# Patient Record
Sex: FEMALE | Race: WHITE | NOT HISPANIC OR LATINO | Employment: UNEMPLOYED | URBAN - NONMETROPOLITAN AREA
[De-identification: names, ages, dates, MRNs, and addresses within clinical notes are randomized per-mention and may not be internally consistent; named-entity substitution may affect disease eponyms.]

---

## 2020-05-12 ENCOUNTER — HISTORICAL (OUTPATIENT)
Dept: ADMINISTRATIVE | Facility: HOSPITAL | Age: 35
End: 2020-05-12

## 2020-05-12 LAB
APPEARANCE, UA: CLEAR
B-HCG UR QL: NEGATIVE
BACTERIA SPEC CULT: NEGATIVE /HPF
BILIRUB UR QL STRIP: NEGATIVE MG/DL
BUDDING YEAST: ABNORMAL /HPF
CASTS, URINE MICROSCOPIC: NEGATIVE /LPF
COLOR UR: YELLOW
EPITHELIAL, URINE MICROSCOPIC: NEGATIVE /HPF
GLUCOSE (UA): NEGATIVE MG/DL
HGB UR QL STRIP: NEGATIVE ERY/UL
INTERNAL NEG CONTROL: NEGATIVE
INTERNAL POS CONTROL: POSITIVE
KETONES UR QL STRIP: NEGATIVE MG/DL
LEUKOCYTE ESTERASE UR QL STRIP: NEGATIVE LEU/UL
NITRITE UR QL STRIP: NEGATIVE MG/DL
PH UR STRIP: 5.5 [PH] (ref 5–7.5)
PROT UR QL STRIP: NEGATIVE MG/DL
RBC #/AREA URNS HPF: NEGATIVE /HPF
SP GR UR STRIP: 1 (ref 1–1.03)
SPERM, URINE MICROSCOPIC: ABNORMAL /HPF
TYPE OF SPECIMEN  (UA): ABNORMAL
UNCLASSIFIED CRYSTALS, UA: ABNORMAL /HPF
UROBILINOGEN UR STRIP-ACNC: NORMAL EU/L
WBC #/AREA URNS HPF: NEGATIVE /HPF

## 2020-12-30 ENCOUNTER — HOSPITAL ENCOUNTER (OUTPATIENT)
Dept: PREADMISSION TESTING | Facility: HOSPITAL | Age: 35
Discharge: HOME | End: 2020-12-30
Attending: SURGERY
Payer: MEDICAID

## 2020-12-30 NOTE — DISCHARGE INSTRUCTIONS
[facility]  Pre Admit Instructions    Day and Date of Procedure:      · Call your doctor if you become ill, have an infection, or are taking antibiotics before your surgery  · Someone will call you between 1 p.m. And 5 p.m. the workday before the procedure to give you an arrival time       - Before 7 a.m. Enter through Emergency Room       - 7 a.m. To 5 p.m. Enter through Patient Registration Main Lobby  · You must have a responsible  to bring you home  · Only one person will be allowed per patient due to Covid-19 restrictions  · You must wear a mask at all times. If you do not have a mask, we will provide you with one. No Exception.   · Cafeteria hours for guest: 7am to 10am; 11am to 1:30 pm.    Do NOT eat anything past:   Clear liquids until:  No gum or mints the morning of your procedure    Please    · Do not wear makeup, jewelry, nail polish or body piercings  · Bring containers/solution for contacts, dentures, bridges - these and hearing aids will be removed before your procedure  · Do not bring cash, jewelry or valuables the day of your procedure   · No smoking at least 24 hours before your procedure  · Wear clothing that is comfortable and easy to take off and put on  · Do NOT shave for at least 5 days before your surgery    PRE-OP Hibiclens bath Instructions:    Shower with Hibiclens the Night before and morning of the procedure.   Wash your face with your regular cleanser. DO NOT use Hibiclens on your face.  Thoroughly rinse your body with warm water from the neck down  Apply Hibiclens directly to your skin or on a wet washcloth and wash gently. Do not stand under the water while washing with Hibiclens as you will   remove the wash too soon.  Rinse thoroughly with warm water  DO NOT use your regular soap after you have bathed with the Hibiclens  Do not apply lotion or deodorant   Put on a clean pair of clothes after each bath          Information about your stay (Please Review)    Before  Surgery  1. Your doctor may order and review labs, x-rays, ECG or other tests as a pre-surgery workup and will call you if there is need for follow up.  2. No smoking inside or outside the hospital. You must leave the campus to smoke.  3. Wear clothing that is easy to take off and put on.  The hospital will provide you with a gown.  4. You may bring robe, slippers, nightwear, and toiletries (toothbrush, toothpaste, makeup).  5. If your doctor orders a Fleets Enema or other prep, follow package and/or doctors orders.  6. Brush your teeth and rinse your mouth the morning of surgery, but dont swallow the water.  7. The nurse will ask questions and check your condition.  The doctor may shruthi your surgical site.  8. Compression boots will be placed on your calves to reduce the risk of blood clots.  9. An IV will be started in pre-procedure area.  10. The doctor may order medicine to help you relax before surgery.  After Surgery  1. After you recover in post-procedure area you will go to the medicine floor to recover for a few more hours.  2. The nurse will check your temperature, breathing, blood pressure, heart rate, IV site, and surgery site.  3. A diet will be ordered-most start with ice chips and then advance slowly to other foods.  4. If you have IV fluids the IV pump will beep to let the nurse know that she needs to check it.  5. You may have a urinary catheter and staff may measure your oral intake and urine output.  6. Pain medication may be ordered by the doctor after surgery.  If you have a pain management device tell your caretakers not to press the button because of OVERDOSE RISK.  7. When the nurse or doctor tells you it is okay to get out of bed, ask for help until you are stable.  8. The nurse may ask you to turn, cough, and deep breathe to prevent lung problems.  You can use a pillow to hold your incision when you deep breathe or cough to reduce pain.  9. The nurse will give you discharge  instructions--incision care, symptoms to report to your doctor, and your follow-up appointment when you are discharged.  You cannot drive yourself home.  10. Only one person may be with you during your stay due to Covid-19 restrictions. Visiting hours are 8 am to 6 pm.  Goal for Discharge from One Day Surgery  · Control pain with an oral medication  · Walk without feeling dizzy or weak  · Tolerate liquids well  · Urinate without difficulty    Things you can do to  Reduce the Risk of Infections or Complications  Wash Hands and use Waterless Hand Sanitizers  · Wash hands frequently with soap and warm water for at least 15 seconds.   · Use hand sanitizers (alcohol based) often at home and in public if hands are not visibly soiled  Take Antibiotic Exactly as Prescribed  · Do not stop antibiotics too soon; you risk developing infection resistant to antibiotics  · Take your antibiotic even if you are feeling better and even if they upset your stomach  · Call the doctor if you cant tolerate the antibiotic or you have an allergic reaction  Stay Healthy  · Take medicines as prescribed by your doctor  · Keep your diabetes under control - diet and medication  · Get enough rest, exercise and eat a healthy diet  Keep the Wound Clean and Dry  · Wash hands before and after taking care of the incision (cut)  · Wash hands when you remove a dressing, before you touch/apply a new dressing  · Shower and clean incision with antibacterial soap and rinse well if the doctor approves  · Allow the cut to dry completely before putting on a clean dressing  · Do not touch the part of the bandage that will cover the incision  · Do not use ointments unless your doctor tells you to-can promote bacterial growth  · If ordered, put ointment directly on the dressing-do not touch the end of the tube  · Do not scrub, remove scabs, or leave a damp dressing on the incision  · Do not use peroxide or alcohol to clean the incision unless the doctor tells  you to   · Do not let children, pets or anyone else contaminate the incision  Stop Smoking To Prevent Infection  · Stop smoking-Centers for Disease Control recommends 30 days before surgery  · Smokers get more infections after surgery-studies have shown 6 times the risk  · Smokers have more scarring and heal slower-open wounds get infected easier  Prevent Respiratory complications  · Stop smoking  · Turn, cough, and deep breathe even if you have some pain when you do so.  · Splint your incision with a pillow when you cough/deep breath, to help control pain.  · Do not lie in one position for long periods of time.   Prevent Blood Clots  · When you wake move your legs, flex your feet, rotate your ankles, wiggle your toes  · Get up when the doctor says its ok.  Dangle your feet from the side of the bed  · Report symptoms-leg pain, redness/swelling, warm to touch; fever; shortness of breath, chest pain, severe upper back pain.

## 2021-01-04 ENCOUNTER — ANESTHESIA (OUTPATIENT)
Dept: SURGERY | Facility: HOSPITAL | Age: 36
End: 2021-01-04
Payer: MEDICAID

## 2021-01-04 ENCOUNTER — HOSPITAL ENCOUNTER (OUTPATIENT)
Facility: HOSPITAL | Age: 36
Discharge: HOME | End: 2021-01-04
Attending: SURGERY | Admitting: SURGERY
Payer: MEDICAID

## 2021-01-04 ENCOUNTER — ANESTHESIA EVENT (OUTPATIENT)
Dept: SURGERY | Facility: HOSPITAL | Age: 36
End: 2021-01-04
Payer: MEDICAID

## 2021-01-04 VITALS
OXYGEN SATURATION: 99 % | RESPIRATION RATE: 18 BRPM | SYSTOLIC BLOOD PRESSURE: 101 MMHG | DIASTOLIC BLOOD PRESSURE: 58 MMHG | HEART RATE: 57 BPM | TEMPERATURE: 97 F

## 2021-01-04 DIAGNOSIS — K42.9 UMBILICAL HERNIA: Primary | ICD-10-CM

## 2021-01-04 LAB
B-HCG UR QL: NEGATIVE
SARS-COV-2 RDRP RESP QL NAA+PROBE: NEGATIVE

## 2021-01-04 PROCEDURE — 71000033 HC RECOVERY, INTIAL HOUR: Performed by: SURGERY

## 2021-01-04 PROCEDURE — 36000707: Performed by: SURGERY

## 2021-01-04 PROCEDURE — 94760 N-INVAS EAR/PLS OXIMETRY 1: CPT

## 2021-01-04 PROCEDURE — 81025 URINE PREGNANCY TEST: CPT

## 2021-01-04 PROCEDURE — 37000009 HC ANESTHESIA EA ADD 15 MINS: Performed by: SURGERY

## 2021-01-04 PROCEDURE — 63600175 PHARM REV CODE 636 W HCPCS: Performed by: NURSE ANESTHETIST, CERTIFIED REGISTERED

## 2021-01-04 PROCEDURE — 25000003 PHARM REV CODE 250: Performed by: SURGERY

## 2021-01-04 PROCEDURE — 37000008 HC ANESTHESIA 1ST 15 MINUTES: Performed by: SURGERY

## 2021-01-04 PROCEDURE — 36000706: Performed by: SURGERY

## 2021-01-04 PROCEDURE — 94761 N-INVAS EAR/PLS OXIMETRY MLT: CPT | Mod: 59

## 2021-01-04 PROCEDURE — 00830 ANES HERNIA RPR LWR ABD NOS: CPT | Performed by: SURGERY

## 2021-01-04 PROCEDURE — U0002 COVID-19 LAB TEST NON-CDC: HCPCS

## 2021-01-04 PROCEDURE — 25000003 PHARM REV CODE 250: Performed by: NURSE ANESTHETIST, CERTIFIED REGISTERED

## 2021-01-04 PROCEDURE — 63600175 PHARM REV CODE 636 W HCPCS: Performed by: SURGERY

## 2021-01-04 PROCEDURE — 00750 ANES HRNA RPR UPR ABD NOS: CPT | Mod: QZ | Performed by: NURSE ANESTHETIST, CERTIFIED REGISTERED

## 2021-01-04 RX ORDER — FENTANYL CITRATE 50 UG/ML
INJECTION, SOLUTION INTRAMUSCULAR; INTRAVENOUS
Status: DISCONTINUED | OUTPATIENT
Start: 2021-01-04 | End: 2021-01-04

## 2021-01-04 RX ORDER — CEFAZOLIN SODIUM 2 G/50ML
2 SOLUTION INTRAVENOUS
Status: COMPLETED | OUTPATIENT
Start: 2021-01-04 | End: 2021-01-04

## 2021-01-04 RX ORDER — KETOROLAC TROMETHAMINE 30 MG/ML
INJECTION, SOLUTION INTRAMUSCULAR; INTRAVENOUS
Status: DISCONTINUED | OUTPATIENT
Start: 2021-01-04 | End: 2021-01-04

## 2021-01-04 RX ORDER — NEOSTIGMINE METHYLSULFATE 5 MG/5 ML
SYRINGE (ML) INTRAVENOUS
Status: DISCONTINUED | OUTPATIENT
Start: 2021-01-04 | End: 2021-01-04

## 2021-01-04 RX ORDER — MIDAZOLAM HYDROCHLORIDE 1 MG/ML
INJECTION INTRAMUSCULAR; INTRAVENOUS
Status: DISCONTINUED | OUTPATIENT
Start: 2021-01-04 | End: 2021-01-04

## 2021-01-04 RX ORDER — HYDROMORPHONE HYDROCHLORIDE 1 MG/ML
1 INJECTION, SOLUTION INTRAMUSCULAR; INTRAVENOUS; SUBCUTANEOUS EVERY 4 HOURS PRN
Status: DISCONTINUED | OUTPATIENT
Start: 2021-01-04 | End: 2021-01-04 | Stop reason: HOSPADM

## 2021-01-04 RX ORDER — IBUPROFEN 600 MG/1
600 TABLET ORAL EVERY 6 HOURS PRN
Status: DISCONTINUED | OUTPATIENT
Start: 2021-01-04 | End: 2021-01-04 | Stop reason: HOSPADM

## 2021-01-04 RX ORDER — DEXAMETHASONE SODIUM PHOSPHATE 4 MG/ML
INJECTION, SOLUTION INTRA-ARTICULAR; INTRALESIONAL; INTRAMUSCULAR; INTRAVENOUS; SOFT TISSUE
Status: DISCONTINUED | OUTPATIENT
Start: 2021-01-04 | End: 2021-01-04

## 2021-01-04 RX ORDER — SODIUM CHLORIDE, SODIUM LACTATE, POTASSIUM CHLORIDE, CALCIUM CHLORIDE 600; 310; 30; 20 MG/100ML; MG/100ML; MG/100ML; MG/100ML
INJECTION, SOLUTION INTRAVENOUS CONTINUOUS PRN
Status: DISCONTINUED | OUTPATIENT
Start: 2021-01-04 | End: 2021-01-04

## 2021-01-04 RX ORDER — ROCURONIUM BROMIDE 10 MG/ML
INJECTION, SOLUTION INTRAVENOUS
Status: DISCONTINUED | OUTPATIENT
Start: 2021-01-04 | End: 2021-01-04

## 2021-01-04 RX ORDER — LIDOCAINE HYDROCHLORIDE 20 MG/ML
INJECTION, SOLUTION EPIDURAL; INFILTRATION; INTRACAUDAL; PERINEURAL
Status: DISCONTINUED | OUTPATIENT
Start: 2021-01-04 | End: 2021-01-04

## 2021-01-04 RX ORDER — BUPIVACAINE HYDROCHLORIDE AND EPINEPHRINE 5; 5 MG/ML; UG/ML
INJECTION, SOLUTION EPIDURAL; INTRACAUDAL; PERINEURAL
Status: DISCONTINUED
Start: 2021-01-04 | End: 2021-01-04 | Stop reason: HOSPADM

## 2021-01-04 RX ORDER — HYDROCODONE BITARTRATE AND ACETAMINOPHEN 5; 325 MG/1; MG/1
1 TABLET ORAL EVERY 4 HOURS PRN
Status: DISCONTINUED | OUTPATIENT
Start: 2021-01-04 | End: 2021-01-04 | Stop reason: HOSPADM

## 2021-01-04 RX ORDER — ONDANSETRON 2 MG/ML
4 INJECTION INTRAMUSCULAR; INTRAVENOUS EVERY 12 HOURS PRN
Status: DISCONTINUED | OUTPATIENT
Start: 2021-01-04 | End: 2021-01-04 | Stop reason: HOSPADM

## 2021-01-04 RX ORDER — ACETAMINOPHEN 10 MG/ML
INJECTION, SOLUTION INTRAVENOUS
Status: DISCONTINUED | OUTPATIENT
Start: 2021-01-04 | End: 2021-01-04

## 2021-01-04 RX ORDER — SODIUM CHLORIDE 9 MG/ML
INJECTION, SOLUTION INTRAVENOUS CONTINUOUS
Status: DISCONTINUED | OUTPATIENT
Start: 2021-01-04 | End: 2021-01-04 | Stop reason: HOSPADM

## 2021-01-04 RX ORDER — ONDANSETRON HYDROCHLORIDE 2 MG/ML
INJECTION, SOLUTION INTRAMUSCULAR; INTRAVENOUS
Status: DISCONTINUED | OUTPATIENT
Start: 2021-01-04 | End: 2021-01-04

## 2021-01-04 RX ORDER — BUPIVACAINE HYDROCHLORIDE AND EPINEPHRINE 5; 5 MG/ML; UG/ML
INJECTION, SOLUTION EPIDURAL; INTRACAUDAL; PERINEURAL
Status: DISCONTINUED | OUTPATIENT
Start: 2021-01-04 | End: 2021-01-04 | Stop reason: HOSPADM

## 2021-01-04 RX ORDER — PROPOFOL 10 MG/ML
VIAL (ML) INTRAVENOUS
Status: DISCONTINUED | OUTPATIENT
Start: 2021-01-04 | End: 2021-01-04

## 2021-01-04 RX ADMIN — FENTANYL CITRATE 50 MCG: 50 INJECTION, SOLUTION INTRAMUSCULAR; INTRAVENOUS at 09:01

## 2021-01-04 RX ADMIN — FENTANYL CITRATE 100 MCG: 50 INJECTION, SOLUTION INTRAMUSCULAR; INTRAVENOUS at 09:01

## 2021-01-04 RX ADMIN — ROCURONIUM BROMIDE 30 MG: 10 INJECTION, SOLUTION INTRAVENOUS at 09:01

## 2021-01-04 RX ADMIN — MIDAZOLAM HYDROCHLORIDE 4 MG: 1 INJECTION, SOLUTION INTRAMUSCULAR; INTRAVENOUS at 09:01

## 2021-01-04 RX ADMIN — Medication 4 MG: at 09:01

## 2021-01-04 RX ADMIN — GLYCOPYRROLATE 0.4 MG: 0.2 INJECTION, SOLUTION INTRAMUSCULAR; INTRAVENOUS at 09:01

## 2021-01-04 RX ADMIN — DEXAMETHASONE SODIUM PHOSPHATE 8 MG: 4 INJECTION, SOLUTION INTRAMUSCULAR; INTRAVENOUS at 09:01

## 2021-01-04 RX ADMIN — SODIUM CHLORIDE, SODIUM LACTATE, POTASSIUM CHLORIDE, AND CALCIUM CHLORIDE: .6; .31; .03; .02 INJECTION, SOLUTION INTRAVENOUS at 09:01

## 2021-01-04 RX ADMIN — PROPOFOL 150 MG: 10 INJECTION, EMULSION INTRAVENOUS at 09:01

## 2021-01-04 RX ADMIN — ACETAMINOPHEN 1000 MG: 10 INJECTION, SOLUTION INTRAVENOUS at 09:01

## 2021-01-04 RX ADMIN — CEFAZOLIN SODIUM 2 G: 2 SOLUTION INTRAVENOUS at 09:01

## 2021-01-04 RX ADMIN — KETOROLAC TROMETHAMINE 30 MG: 30 INJECTION, SOLUTION INTRAMUSCULAR; INTRAVENOUS at 09:01

## 2021-01-04 RX ADMIN — ONDANSETRON 8 MG: 2 INJECTION, SOLUTION INTRAMUSCULAR; INTRAVENOUS at 09:01

## 2021-01-04 RX ADMIN — LIDOCAINE HYDROCHLORIDE 80 MG: 20 INJECTION, SOLUTION EPIDURAL; INFILTRATION; INTRACAUDAL; PERINEURAL at 09:01

## 2021-06-07 ENCOUNTER — TELEPHONE (OUTPATIENT)
Dept: OBSTETRICS AND GYNECOLOGY | Facility: CLINIC | Age: 36
End: 2021-06-07

## 2021-06-13 ENCOUNTER — HOSPITAL ENCOUNTER (EMERGENCY)
Facility: HOSPITAL | Age: 36
Discharge: HOME | End: 2021-06-13
Attending: EMERGENCY MEDICINE
Payer: MEDICAID

## 2021-06-13 VITALS
HEART RATE: 100 BPM | RESPIRATION RATE: 18 BRPM | WEIGHT: 113 LBS | OXYGEN SATURATION: 99 % | SYSTOLIC BLOOD PRESSURE: 134 MMHG | DIASTOLIC BLOOD PRESSURE: 97 MMHG | HEIGHT: 65 IN | BODY MASS INDEX: 18.83 KG/M2 | TEMPERATURE: 98 F

## 2021-06-13 DIAGNOSIS — R11.2 NON-INTRACTABLE VOMITING WITH NAUSEA, UNSPECIFIED VOMITING TYPE: Primary | ICD-10-CM

## 2021-06-13 PROCEDURE — 63600175 PHARM REV CODE 636 W HCPCS: Performed by: NURSE PRACTITIONER

## 2021-06-13 PROCEDURE — 99284 EMERGENCY DEPT VISIT MOD MDM: CPT | Mod: 25

## 2021-06-13 PROCEDURE — 96372 THER/PROPH/DIAG INJ SC/IM: CPT

## 2021-06-13 RX ORDER — PROMETHAZINE HYDROCHLORIDE 25 MG/ML
25 INJECTION, SOLUTION INTRAMUSCULAR; INTRAVENOUS
Status: COMPLETED | OUTPATIENT
Start: 2021-06-13 | End: 2021-06-13

## 2021-06-13 RX ORDER — PROMETHAZINE HYDROCHLORIDE 25 MG/1
25 TABLET ORAL EVERY 6 HOURS PRN
Qty: 15 TABLET | Refills: 0 | Status: SHIPPED | OUTPATIENT
Start: 2021-06-13 | End: 2023-04-03 | Stop reason: CLARIF

## 2021-06-13 RX ADMIN — PROMETHAZINE HYDROCHLORIDE 25 MG: 25 INJECTION INTRAMUSCULAR; INTRAVENOUS at 03:06

## 2021-11-01 ENCOUNTER — HOSPITAL ENCOUNTER (EMERGENCY)
Facility: HOSPITAL | Age: 36
Discharge: HOME | End: 2021-11-01
Attending: EMERGENCY MEDICINE
Payer: MEDICAID

## 2021-11-01 VITALS
WEIGHT: 114 LBS | HEART RATE: 58 BPM | BODY MASS INDEX: 18.99 KG/M2 | HEIGHT: 65 IN | RESPIRATION RATE: 16 BRPM | SYSTOLIC BLOOD PRESSURE: 142 MMHG | TEMPERATURE: 97 F | OXYGEN SATURATION: 100 % | DIASTOLIC BLOOD PRESSURE: 79 MMHG

## 2021-11-01 DIAGNOSIS — O21.9 VOMITING AFFECTING PREGNANCY: Primary | ICD-10-CM

## 2021-11-01 LAB
ALBUMIN SERPL BCP-MCNC: 4.4 G/DL (ref 3.5–5.2)
ALP SERPL-CCNC: 50 U/L (ref 55–135)
ALT SERPL W/O P-5'-P-CCNC: 32 U/L (ref 10–44)
AMPHET+METHAMPHET UR QL: NEGATIVE
ANION GAP SERPL CALC-SCNC: 7 MMOL/L (ref 8–16)
AST SERPL-CCNC: 22 U/L (ref 10–40)
B-HCG UR QL: POSITIVE
BARBITURATES UR QL SCN>200 NG/ML: NEGATIVE
BASOPHILS # BLD AUTO: 0.04 K/UL (ref 0–0.2)
BASOPHILS NFR BLD: 0.5 % (ref 0–1.9)
BENZODIAZ UR QL SCN>200 NG/ML: NEGATIVE
BILIRUB SERPL-MCNC: 0.8 MG/DL (ref 0.1–1)
BUN SERPL-MCNC: 12 MG/DL (ref 6–20)
BZE UR QL SCN: NEGATIVE
CALCIUM SERPL-MCNC: 9.2 MG/DL (ref 8.7–10.5)
CANNABINOIDS UR QL SCN: ABNORMAL
CHLORIDE SERPL-SCNC: 101 MMOL/L (ref 95–110)
CO2 SERPL-SCNC: 27 MMOL/L (ref 23–29)
CREAT SERPL-MCNC: 0.7 MG/DL (ref 0.5–1.4)
CREAT UR-MCNC: 158 MG/DL (ref 15–325)
DIFFERENTIAL METHOD: ABNORMAL
EOSINOPHIL # BLD AUTO: 0 K/UL (ref 0–0.5)
EOSINOPHIL NFR BLD: 0.2 % (ref 0–8)
ERYTHROCYTE [DISTWIDTH] IN BLOOD BY AUTOMATED COUNT: 11.6 % (ref 11.5–14.5)
EST. GFR  (AFRICAN AMERICAN): >60 ML/MIN/1.73 M^2
EST. GFR  (NON AFRICAN AMERICAN): >60 ML/MIN/1.73 M^2
GLUCOSE SERPL-MCNC: 99 MG/DL (ref 70–110)
HCT VFR BLD AUTO: 47.3 % (ref 37–48.5)
HGB BLD-MCNC: 16.4 G/DL (ref 12–16)
IMM GRANULOCYTES # BLD AUTO: 0.05 K/UL (ref 0–0.04)
IMM GRANULOCYTES NFR BLD AUTO: 0.6 % (ref 0–0.5)
LYMPHOCYTES # BLD AUTO: 1.3 K/UL (ref 1–4.8)
LYMPHOCYTES NFR BLD: 15.8 % (ref 18–48)
MCH RBC QN AUTO: 31.9 PG (ref 27–31)
MCHC RBC AUTO-ENTMCNC: 34.7 G/DL (ref 32–36)
MCV RBC AUTO: 92 FL (ref 82–98)
METHADONE UR QL SCN>300 NG/ML: NEGATIVE
MONOCYTES # BLD AUTO: 0.5 K/UL (ref 0.3–1)
MONOCYTES NFR BLD: 5.6 % (ref 4–15)
NEUTROPHILS # BLD AUTO: 6.4 K/UL (ref 1.8–7.7)
NEUTROPHILS NFR BLD: 77.3 % (ref 38–73)
NRBC BLD-RTO: 0 /100 WBC
OPIATES UR QL SCN: NEGATIVE
PCP UR QL SCN>25 NG/ML: NEGATIVE
PLATELET # BLD AUTO: 269 K/UL (ref 150–450)
PMV BLD AUTO: 8.9 FL (ref 9.2–12.9)
POTASSIUM SERPL-SCNC: 3.7 MMOL/L (ref 3.5–5.1)
PROT SERPL-MCNC: 8.2 G/DL (ref 6–8.4)
RBC # BLD AUTO: 5.14 M/UL (ref 4–5.4)
SODIUM SERPL-SCNC: 135 MMOL/L (ref 136–145)
TOXICOLOGY INFORMATION: ABNORMAL
WBC # BLD AUTO: 8.22 K/UL (ref 3.9–12.7)

## 2021-11-01 PROCEDURE — 63600175 PHARM REV CODE 636 W HCPCS: Performed by: CLINICAL NURSE SPECIALIST

## 2021-11-01 PROCEDURE — 80053 COMPREHEN METABOLIC PANEL: CPT | Performed by: CLINICAL NURSE SPECIALIST

## 2021-11-01 PROCEDURE — 81025 URINE PREGNANCY TEST: CPT | Performed by: CLINICAL NURSE SPECIALIST

## 2021-11-01 PROCEDURE — 96374 THER/PROPH/DIAG INJ IV PUSH: CPT

## 2021-11-01 PROCEDURE — 25000003 PHARM REV CODE 250: Performed by: CLINICAL NURSE SPECIALIST

## 2021-11-01 PROCEDURE — 96361 HYDRATE IV INFUSION ADD-ON: CPT

## 2021-11-01 PROCEDURE — 99284 EMERGENCY DEPT VISIT MOD MDM: CPT | Mod: 25

## 2021-11-01 PROCEDURE — 85025 COMPLETE CBC W/AUTO DIFF WBC: CPT | Performed by: CLINICAL NURSE SPECIALIST

## 2021-11-01 PROCEDURE — 80307 DRUG TEST PRSMV CHEM ANLYZR: CPT | Performed by: CLINICAL NURSE SPECIALIST

## 2021-11-01 RX ORDER — ONDANSETRON 4 MG/1
4 TABLET, FILM COATED ORAL EVERY 6 HOURS
Qty: 12 TABLET | Refills: 0 | Status: SHIPPED | OUTPATIENT
Start: 2021-11-01 | End: 2023-04-03 | Stop reason: CLARIF

## 2021-11-01 RX ORDER — PROCHLORPERAZINE EDISYLATE 5 MG/ML
10 INJECTION INTRAMUSCULAR; INTRAVENOUS
Status: COMPLETED | OUTPATIENT
Start: 2021-11-01 | End: 2021-11-01

## 2021-11-01 RX ADMIN — SODIUM CHLORIDE 1000 ML: 0.9 INJECTION, SOLUTION INTRAVENOUS at 10:11

## 2021-11-01 RX ADMIN — PROCHLORPERAZINE EDISYLATE 10 MG: 5 INJECTION INTRAMUSCULAR; INTRAVENOUS at 09:11

## 2022-05-31 ENCOUNTER — HOSPITAL ENCOUNTER (EMERGENCY)
Facility: HOSPITAL | Age: 37
Discharge: HOME | End: 2022-05-31
Attending: FAMILY MEDICINE
Payer: MEDICAID

## 2022-05-31 VITALS
TEMPERATURE: 98 F | RESPIRATION RATE: 18 BRPM | WEIGHT: 120 LBS | OXYGEN SATURATION: 99 % | SYSTOLIC BLOOD PRESSURE: 117 MMHG | BODY MASS INDEX: 19.99 KG/M2 | DIASTOLIC BLOOD PRESSURE: 64 MMHG | HEIGHT: 65 IN | HEART RATE: 105 BPM

## 2022-05-31 DIAGNOSIS — M25.561 ACUTE PAIN OF RIGHT KNEE: Primary | ICD-10-CM

## 2022-05-31 PROCEDURE — 25000003 PHARM REV CODE 250: Performed by: CLINICAL NURSE SPECIALIST

## 2022-05-31 PROCEDURE — 99284 EMERGENCY DEPT VISIT MOD MDM: CPT | Mod: 25

## 2022-05-31 RX ORDER — HYDROCODONE BITARTRATE AND ACETAMINOPHEN 10; 325 MG/1; MG/1
1 TABLET ORAL
Status: COMPLETED | OUTPATIENT
Start: 2022-05-31 | End: 2022-05-31

## 2022-05-31 RX ORDER — METHOCARBAMOL 500 MG/1
500 TABLET, FILM COATED ORAL 4 TIMES DAILY
Qty: 40 TABLET | Refills: 0 | Status: SHIPPED | OUTPATIENT
Start: 2022-05-31 | End: 2022-06-10

## 2022-05-31 RX ORDER — KETOROLAC TROMETHAMINE 10 MG/1
10 TABLET, FILM COATED ORAL EVERY 6 HOURS
Qty: 20 TABLET | Refills: 0 | Status: SHIPPED | OUTPATIENT
Start: 2022-05-31 | End: 2022-06-05

## 2022-05-31 RX ADMIN — HYDROCODONE BITARTRATE AND ACETAMINOPHEN 1 TABLET: 10; 325 TABLET ORAL at 11:05

## 2022-05-31 NOTE — ED PROVIDER NOTES
Encounter Date: 5/31/2022       History     Chief Complaint   Patient presents with    Knee Injury     Pt stated that she fell last night injuring right knee - stated that when she would attempt to bear weight it would give out. Presents to ED today with pain.      Flora Madrid is an 36 y.o. female who complains of right knee pain since last night.  Patient states she tripped and fell and now unable to bear weight.  Patient states she attempted to bear weight last night in the hip giving out.        Review of patient's allergies indicates:   Allergen Reactions    Latex, natural rubber      No past medical history on file.  Past Surgical History:   Procedure Laterality Date    CERVICAL BIOPSY  W/ LOOP ELECTRODE EXCISION      UMBILICAL HERNIA REPAIR N/A 1/4/2021    Procedure: REPAIR, HERNIA, UMBILICAL;  Surgeon: Castro De Leon MD;  Location: [facility] OR;  Service: General;  Laterality: N/A;     Family History   Problem Relation Age of Onset    Ovarian cancer Mother     COPD Mother     Heart disease Mother     Prostate cancer Father     Heart attack Father     Heart disease Father     Stroke Father      Social History     Tobacco Use    Smoking status: Never Smoker   Substance Use Topics    Alcohol use: Yes     Comment: socially     Drug use: Yes     Types: Marijuana     Review of Systems   Constitutional: Negative for fever.   HENT: Negative for sore throat.    Respiratory: Negative for shortness of breath.    Cardiovascular: Negative for chest pain.   Gastrointestinal: Negative for nausea.   Genitourinary: Negative for dysuria.   Musculoskeletal: Positive for gait problem.   Skin: Negative for rash.   Neurological: Negative for weakness.   Hematological: Does not bruise/bleed easily.   All other systems reviewed and are negative.      Physical Exam     Initial Vitals [05/31/22 1015]   BP Pulse Resp Temp SpO2   117/64 105 16 98.4 °F (36.9 °C) 99 %      MAP       --         Physical Exam    Nursing note  and vitals reviewed.  Constitutional: She appears well-developed and well-nourished.   HENT:   Head: Normocephalic and atraumatic.   Eyes: Pupils are equal, round, and reactive to light.   Neck:   Normal range of motion.  Cardiovascular: Normal rate and regular rhythm.   Pulmonary/Chest: Breath sounds normal.   Abdominal: Abdomen is soft. Bowel sounds are normal.   Musculoskeletal:         General: Tenderness and edema present.      Cervical back: Normal range of motion.     Neurological: She is alert and oriented to person, place, and time.   Skin: Skin is warm and dry.   Psychiatric: She has a normal mood and affect.         ED Course   Procedures  Labs Reviewed - No data to display       Imaging Results          X-Ray Knee 1 or 2 View Right (Final result)  Result time 05/31/22 10:51:24    Final result by Ketan Fernandes MD (05/31/22 10:51:24)                 Impression:      No fracture.      Electronically signed by: Ketan Fernandes MD  Date:    05/31/2022  Time:    10:51             Narrative:    EXAMINATION:  XR KNEE 1 OR 2 VIEW RIGHT    CLINICAL HISTORY:  fall;    COMPARISON:  None    FINDINGS:  Right knee radiographs, three views, demonstrate no fracture or dislocation.  No focal soft tissue abnormality.                    ED Interpretation by Dennis Kc Jr., MD (05/31/22 10:43:18, [facility], Emergency Medicine)    No acute abnormality noted                               Medications - No data to display  Medical Decision Making:   Differential Diagnosis:   Right knee contusion, right knee fracture  Clinical Tests:   Radiological Study: Ordered and Reviewed                      Clinical Impression:   Final diagnoses:  [M25.561] Acute pain of right knee (Primary)          ED Disposition Condition    Discharge Stable        ED Prescriptions     Medication Sig Dispense Start Date End Date Auth. Provider    methocarbamoL (ROBAXIN) 500 MG Tab Take 1 tablet (500 mg total) by mouth 4  (four) times daily. for 10 days 40 tablet 5/31/2022 6/10/2022 Gwen Marques NP    ketorolac (TORADOL) 10 mg tablet Take 1 tablet (10 mg total) by mouth every 6 (six) hours. for 5 days 20 tablet 5/31/2022 6/5/2022 Gwen Marques NP        Follow-up Information     Follow up With Specialties Details Why Contact Info    Rose Bergman MD Internal Medicine  As needed [address] [address]  [phone]             Gwen Marques NP  05/31/22 4766

## 2022-06-06 ENCOUNTER — OFFICE VISIT (OUTPATIENT)
Dept: ORTHOPEDICS | Facility: CLINIC | Age: 37
End: 2022-06-06
Payer: MEDICAID

## 2022-06-06 VITALS
HEART RATE: 81 BPM | TEMPERATURE: 98 F | OXYGEN SATURATION: 100 % | DIASTOLIC BLOOD PRESSURE: 84 MMHG | SYSTOLIC BLOOD PRESSURE: 139 MMHG | BODY MASS INDEX: 20.49 KG/M2 | WEIGHT: 120 LBS | HEIGHT: 64 IN

## 2022-06-06 DIAGNOSIS — M25.561 ACUTE PAIN OF RIGHT KNEE: Primary | ICD-10-CM

## 2022-06-06 PROCEDURE — 3075F SYST BP GE 130 - 139MM HG: CPT | Mod: CPTII,,, | Performed by: NURSE PRACTITIONER

## 2022-06-06 PROCEDURE — 1159F MED LIST DOCD IN RCRD: CPT | Mod: CPTII,,, | Performed by: NURSE PRACTITIONER

## 2022-06-06 PROCEDURE — 1160F RVW MEDS BY RX/DR IN RCRD: CPT | Mod: CPTII,,, | Performed by: NURSE PRACTITIONER

## 2022-06-06 PROCEDURE — 1160F PR REVIEW ALL MEDS BY PRESCRIBER/CLIN PHARMACIST DOCUMENTED: ICD-10-PCS | Mod: CPTII,,, | Performed by: NURSE PRACTITIONER

## 2022-06-06 PROCEDURE — 3075F PR MOST RECENT SYSTOLIC BLOOD PRESS GE 130-139MM HG: ICD-10-PCS | Mod: CPTII,,, | Performed by: NURSE PRACTITIONER

## 2022-06-06 PROCEDURE — 3079F PR MOST RECENT DIASTOLIC BLOOD PRESSURE 80-89 MM HG: ICD-10-PCS | Mod: CPTII,,, | Performed by: NURSE PRACTITIONER

## 2022-06-06 PROCEDURE — 99213 OFFICE O/P EST LOW 20 MIN: CPT | Mod: PBBFAC | Performed by: NURSE PRACTITIONER

## 2022-06-06 PROCEDURE — 3008F PR BODY MASS INDEX (BMI) DOCUMENTED: ICD-10-PCS | Mod: CPTII,,, | Performed by: NURSE PRACTITIONER

## 2022-06-06 PROCEDURE — 99999 PR PBB SHADOW E&M-EST. PATIENT-LVL III: ICD-10-PCS | Mod: PBBFAC,,, | Performed by: NURSE PRACTITIONER

## 2022-06-06 PROCEDURE — 99999 PR PBB SHADOW E&M-EST. PATIENT-LVL III: CPT | Mod: PBBFAC,,, | Performed by: NURSE PRACTITIONER

## 2022-06-06 PROCEDURE — 99203 PR OFFICE/OUTPT VISIT, NEW, LEVL III, 30-44 MIN: ICD-10-PCS | Mod: S$PBB,,, | Performed by: NURSE PRACTITIONER

## 2022-06-06 PROCEDURE — 3079F DIAST BP 80-89 MM HG: CPT | Mod: CPTII,,, | Performed by: NURSE PRACTITIONER

## 2022-06-06 PROCEDURE — 99203 OFFICE O/P NEW LOW 30 MIN: CPT | Mod: S$PBB,,, | Performed by: NURSE PRACTITIONER

## 2022-06-06 PROCEDURE — 3008F BODY MASS INDEX DOCD: CPT | Mod: CPTII,,, | Performed by: NURSE PRACTITIONER

## 2022-06-06 PROCEDURE — 1159F PR MEDICATION LIST DOCUMENTED IN MEDICAL RECORD: ICD-10-PCS | Mod: CPTII,,, | Performed by: NURSE PRACTITIONER

## 2022-06-06 RX ORDER — LISDEXAMFETAMINE DIMESYLATE CAPSULES 20 MG/1
30 CAPSULE ORAL EVERY MORNING
COMMUNITY

## 2022-06-06 NOTE — PROGRESS NOTES
Subjective:      Flora Madrid is a 36 y.o. female referred by [facility] for evaluation and treatment of right knee pain. This is evaluated as a personal injury. Date of injury was on 05/30/22. She states that she had a fall and twisted the knee. She had immediate pain and difficulty bearing weight. She reports swelling within a few hours. She went to the ER the following day due to pain. Radiographs were done and fractures were seen. She was placed on Robaxin and Toradol. She was instructed that she sprained her knee/possible internal derangement and to follow up with orthopedics. She presents and rates her pain as 7/10. The pain's location is medial. She describes the symptoms as sharp and throbbing. Symptoms improve minimal with rest, ice and Nsaid. Symptoms worsen with activity, weight bearing and ROM. The knee has given out or felt unstable since injury. The patient cannot bend and straighten the knee fully. Treatment to date has been ice, NSAID's, without significant relief.    Outside reports reviewed: office notes.       Review of Systems   Constitutional: Negative.  Negative for fever.   Musculoskeletal: Positive for joint pain.   Skin: Negative.    Neurological: Negative.    Psychiatric/Behavioral: Negative.    All other systems reviewed and are negative.     Objective:     NVI  General :   alert, appears stated age and cooperative   Gait: Antalgic. The patient can bear weight on the injured extremity.   Right Lower Extremity  Hip Palpation:  no tenderness over the greater  trochanter   Hip ROM: 100% of normal      Knee Effusion:  1+   Ecchymosis:  none   Knee ROM:  5 to 120 degrees without subpatellar crepitance.   Patella:  Patella does track normally.  Patellar apprehension test: negative  Patellar compression test: negative   Tenderness: medial joint line   Stability:  Lachman's test: negative  Posterior drawer: negative  Medial collateral ligament: present  Lateral collateral ligament: negative     Russell  Test:  negative   Kadeem's Test:  present with no joint line tenderness   Sensation:   intact to light touch   Pulses: normal DP and PT pulses.    Contralateral knee was without deficit.   Brisk cap refill.     Imaging  Radiographs reviewed from 05/31/22.  Right knee radiographs, three views, demonstrate no fracture or dislocation.  No focal soft tissue abnormality.  Assessment:     1. RT knee pain, effusion and unknown internal derangement.     Plan:     1. Treatment options discussed including: Application of crossfire brace for joint support, instructed on usage.   2. Home directed exercises as reviewed for ROM and strengthening, packet given.   3. Pennsaid BID as directed (sample) and ice.  4. Follow up in one week. Consider MRI if pain, swelling and instability continues with concerns of internal derangement.   5. She had no further questions.

## 2022-06-14 ENCOUNTER — OFFICE VISIT (OUTPATIENT)
Dept: ORTHOPEDICS | Facility: CLINIC | Age: 37
End: 2022-06-14
Payer: MEDICAID

## 2022-06-14 VITALS — SYSTOLIC BLOOD PRESSURE: 125 MMHG | DIASTOLIC BLOOD PRESSURE: 76 MMHG

## 2022-06-14 DIAGNOSIS — M25.561 ACUTE PAIN OF RIGHT KNEE: Primary | ICD-10-CM

## 2022-06-14 DIAGNOSIS — M25.461 EFFUSION OF RIGHT KNEE: ICD-10-CM

## 2022-06-14 DIAGNOSIS — M23.91 INTERNAL DERANGEMENT OF RIGHT KNEE: ICD-10-CM

## 2022-06-14 PROCEDURE — 1159F PR MEDICATION LIST DOCUMENTED IN MEDICAL RECORD: ICD-10-PCS | Mod: CPTII,,, | Performed by: NURSE PRACTITIONER

## 2022-06-14 PROCEDURE — 1159F MED LIST DOCD IN RCRD: CPT | Mod: CPTII,,, | Performed by: NURSE PRACTITIONER

## 2022-06-14 PROCEDURE — 99213 OFFICE O/P EST LOW 20 MIN: CPT | Mod: S$PBB,,, | Performed by: NURSE PRACTITIONER

## 2022-06-14 PROCEDURE — 3074F PR MOST RECENT SYSTOLIC BLOOD PRESSURE < 130 MM HG: ICD-10-PCS | Mod: CPTII,,, | Performed by: NURSE PRACTITIONER

## 2022-06-14 PROCEDURE — 3078F DIAST BP <80 MM HG: CPT | Mod: CPTII,,, | Performed by: NURSE PRACTITIONER

## 2022-06-14 PROCEDURE — 99999 PR PBB SHADOW E&M-EST. PATIENT-LVL II: ICD-10-PCS | Mod: PBBFAC,,, | Performed by: NURSE PRACTITIONER

## 2022-06-14 PROCEDURE — 3078F PR MOST RECENT DIASTOLIC BLOOD PRESSURE < 80 MM HG: ICD-10-PCS | Mod: CPTII,,, | Performed by: NURSE PRACTITIONER

## 2022-06-14 PROCEDURE — 99213 PR OFFICE/OUTPT VISIT, EST, LEVL III, 20-29 MIN: ICD-10-PCS | Mod: S$PBB,,, | Performed by: NURSE PRACTITIONER

## 2022-06-14 PROCEDURE — 1160F RVW MEDS BY RX/DR IN RCRD: CPT | Mod: CPTII,,, | Performed by: NURSE PRACTITIONER

## 2022-06-14 PROCEDURE — 3074F SYST BP LT 130 MM HG: CPT | Mod: CPTII,,, | Performed by: NURSE PRACTITIONER

## 2022-06-14 PROCEDURE — 99212 OFFICE O/P EST SF 10 MIN: CPT | Mod: PBBFAC | Performed by: NURSE PRACTITIONER

## 2022-06-14 PROCEDURE — 1160F PR REVIEW ALL MEDS BY PRESCRIBER/CLIN PHARMACIST DOCUMENTED: ICD-10-PCS | Mod: CPTII,,, | Performed by: NURSE PRACTITIONER

## 2022-06-14 PROCEDURE — 99999 PR PBB SHADOW E&M-EST. PATIENT-LVL II: CPT | Mod: PBBFAC,,, | Performed by: NURSE PRACTITIONER

## 2022-06-14 NOTE — PROGRESS NOTES
Subjective:      Follow up: RT knee pain     Flora Madrid is a 36 y.o. female returns for follow up for right knee pain. Date of injury was on 05/30/22 due to a fall.  She presents and rates her pain as 7-8/10. The pain's location is medial as previous. She continues with swelling, giving way and occasional locking. She does not have full ROM. She describes the symptoms as sharp and throbbing. Symptoms improve minimal with rest, ice and Nsaid (pennsaid). Symptoms worsen with activity, weight bearing and ROM. She is noted with a limp.          Review of Systems   Constitutional: Negative.  Negative for fever.   Musculoskeletal: Positive for joint pain.   Skin: Negative.    Neurological: Negative.    Psychiatric/Behavioral: Negative.    All other systems reviewed and are negative.     Objective:      NVI  General :   alert, appears stated age and cooperative   Gait: Antalgic. The patient can bear weight on the injured extremity but has pain to do so.   Right Lower Extremity  Hip Palpation:  no tenderness over the greater  trochanter   Hip ROM: 100% of normal       Knee Effusion:  1+   Ecchymosis:  none   Knee ROM:  5 to 115 degrees without subpatellar crepitance.   Patella:  Patella does track normally.  Patellar apprehension test: negative  Patellar compression test: negative   Tenderness: medial joint line   Stability:  Lachman's test: guarded  Posterior drawer: negative  Medial collateral ligament: present  Lateral collateral ligament: negative     Russell Test:  negative   Kadeem's Test:  present with medial joint line tenderness   Sensation:   intact to light touch   Pulses: normal DP and PT pulses.    Contralateral knee was without deficit.   Brisk cap refill.      Imaging  None today.   Assessment:      1. RT knee pain, effusion and unknown internal derangement.     Plan:      1. MRI RT knee to evaluate for internal derangement. She has pain, effusion, decreased ROM and pos [place]. She has had no relief  with bracing, ice, rest and Nsaid.  2. Continue home directed exercises as reviewed for ROM and strengthening as previous.   3. Continue Pennsaid BID and ice.  4. Follow up post MRI to discuss further treatment.

## 2022-06-21 ENCOUNTER — HOSPITAL ENCOUNTER (OUTPATIENT)
Dept: RADIOLOGY | Facility: HOSPITAL | Age: 37
Discharge: HOME | End: 2022-06-21
Attending: NURSE PRACTITIONER
Payer: MEDICAID

## 2022-06-21 DIAGNOSIS — M23.91 INTERNAL DERANGEMENT OF RIGHT KNEE: ICD-10-CM

## 2022-06-21 DIAGNOSIS — M25.461 EFFUSION OF RIGHT KNEE: ICD-10-CM

## 2022-06-21 PROCEDURE — 73721 MRI JNT OF LWR EXTRE W/O DYE: CPT | Mod: TC,RT

## 2022-06-23 ENCOUNTER — OFFICE VISIT (OUTPATIENT)
Dept: ORTHOPEDICS | Facility: CLINIC | Age: 37
End: 2022-06-23
Payer: MEDICAID

## 2022-06-23 VITALS — DIASTOLIC BLOOD PRESSURE: 78 MMHG | SYSTOLIC BLOOD PRESSURE: 110 MMHG

## 2022-06-23 DIAGNOSIS — M25.461 EFFUSION OF RIGHT KNEE: ICD-10-CM

## 2022-06-23 DIAGNOSIS — S83.241D ACUTE MEDIAL MENISCUS TEAR OF RIGHT KNEE, SUBSEQUENT ENCOUNTER: ICD-10-CM

## 2022-06-23 DIAGNOSIS — S83.411D SPRAIN OF MEDIAL COLLATERAL LIGAMENT OF RIGHT KNEE, SUBSEQUENT ENCOUNTER: ICD-10-CM

## 2022-06-23 DIAGNOSIS — S83.511D RUPTURE OF ANTERIOR CRUCIATE LIGAMENT OF RIGHT KNEE, SUBSEQUENT ENCOUNTER: Primary | ICD-10-CM

## 2022-06-23 PROBLEM — S83.511A RUPTURE OF ANTERIOR CRUCIATE LIGAMENT OF RIGHT KNEE: Status: ACTIVE | Noted: 2022-06-23

## 2022-06-23 PROBLEM — S83.241A ACUTE MEDIAL MENISCUS TEAR OF RIGHT KNEE: Status: ACTIVE | Noted: 2022-06-23

## 2022-06-23 PROBLEM — S83.411A SPRAIN OF MEDIAL COLLATERAL LIGAMENT OF RIGHT KNEE: Status: ACTIVE | Noted: 2022-06-23

## 2022-06-23 PROCEDURE — 3078F PR MOST RECENT DIASTOLIC BLOOD PRESSURE < 80 MM HG: ICD-10-PCS | Mod: CPTII,,, | Performed by: NURSE PRACTITIONER

## 2022-06-23 PROCEDURE — 1159F MED LIST DOCD IN RCRD: CPT | Mod: CPTII,,, | Performed by: NURSE PRACTITIONER

## 2022-06-23 PROCEDURE — 99213 OFFICE O/P EST LOW 20 MIN: CPT | Mod: S$PBB,,, | Performed by: NURSE PRACTITIONER

## 2022-06-23 PROCEDURE — 1160F PR REVIEW ALL MEDS BY PRESCRIBER/CLIN PHARMACIST DOCUMENTED: ICD-10-PCS | Mod: CPTII,,, | Performed by: NURSE PRACTITIONER

## 2022-06-23 PROCEDURE — 99999 PR PBB SHADOW E&M-EST. PATIENT-LVL II: CPT | Mod: PBBFAC,,, | Performed by: NURSE PRACTITIONER

## 2022-06-23 PROCEDURE — 99213 PR OFFICE/OUTPT VISIT, EST, LEVL III, 20-29 MIN: ICD-10-PCS | Mod: S$PBB,,, | Performed by: NURSE PRACTITIONER

## 2022-06-23 PROCEDURE — 3074F SYST BP LT 130 MM HG: CPT | Mod: CPTII,,, | Performed by: NURSE PRACTITIONER

## 2022-06-23 PROCEDURE — 1160F RVW MEDS BY RX/DR IN RCRD: CPT | Mod: CPTII,,, | Performed by: NURSE PRACTITIONER

## 2022-06-23 PROCEDURE — 3078F DIAST BP <80 MM HG: CPT | Mod: CPTII,,, | Performed by: NURSE PRACTITIONER

## 2022-06-23 PROCEDURE — 99212 OFFICE O/P EST SF 10 MIN: CPT | Mod: PBBFAC | Performed by: NURSE PRACTITIONER

## 2022-06-23 PROCEDURE — 1159F PR MEDICATION LIST DOCUMENTED IN MEDICAL RECORD: ICD-10-PCS | Mod: CPTII,,, | Performed by: NURSE PRACTITIONER

## 2022-06-23 PROCEDURE — 99999 PR PBB SHADOW E&M-EST. PATIENT-LVL II: ICD-10-PCS | Mod: PBBFAC,,, | Performed by: NURSE PRACTITIONER

## 2022-06-23 PROCEDURE — 3074F PR MOST RECENT SYSTOLIC BLOOD PRESSURE < 130 MM HG: ICD-10-PCS | Mod: CPTII,,, | Performed by: NURSE PRACTITIONER

## 2022-06-23 NOTE — PROGRESS NOTES
Subjective:      Follow up: RT knee pain/MRI      Flora Madrid is a 36 y.o. female returns for follow up for right knee pain. Date of injury was on 05/30/22 due to a fall. She is here for MRI review. She presents and states that her knee has improved since our last visit and rates her pain as 4/10. She states that she is still having giving out and at times the knee feels unstable. The pain's location is medial as previous. She states that she continues with mild swelling, giving way and occasional locking. She reports improved ROM. She describes the symptoms as sharp and throbbing. Symptoms improve minimal with rest, ice and Nsaid. Symptoms worsen with activity, weight bearing and ROM. She is noted with a limp.          Review of Systems   Constitutional: Negative.  Negative for fever.   Musculoskeletal: Positive for joint pain.   Skin: Negative.    Neurological: Negative.    Psychiatric/Behavioral: Negative.    All other systems reviewed and are negative.     Objective:      NVI  General :   alert, appears stated age and cooperative   Gait: Antalgic. The patient can bear weight on the injured extremity.   Right Lower Extremity  Hip Palpation:  no tenderness over the greater  trochanter   Hip ROM: 100% of normal       Knee Effusion:  1+   Ecchymosis:  none   Knee ROM:  5 to 115 degrees without subpatellar crepitance.   Patella:  Patella does track normally.  Patellar apprehension test: negative  Patellar compression test: negative   Tenderness: medial joint line   Stability:  Lachman's test: Guarded  Posterior drawer: negative  Medial collateral ligament: present  Lateral collateral ligament: negative     Russell Test:  negative   Kadeem's Test:  present with medial joint line tenderness   Sensation:   intact to light touch   Pulses: normal DP and PT pulses.    Contralateral knee was without deficit.   Brisk cap refill.      Imaging  MRI RT knee: Impression:     1. ACL tear.  2. Vertical tear posterior horn medial  meniscus.  3. Mild MCL sprain.  4. Small joint effusion.  5. Bone marrow edema, likely contusions central and lateral tibial plateau and lateral femoral condyle.  Assessment:      1. RT knee pain, ACL tear, medial meniscal tear and bone contusion.      Plan:      1. MRI RT knee reviewed, consistent with an ACL tear, medial meniscal tear, MCL sprain and bone contusion.  2. Referral to Dr Malave for further evaluation of the ACL tear and meniscal tear.   3. She was placed in a post op knee brace for support. WBAT. The brace was set 10 to 90, instructed on usage.  4. Continue home directed exercises as reviewed for ROM and strengthening as previous.   5. Continue Pennsaid BID and ice.  6. She had no further questions.

## 2022-06-24 ENCOUNTER — TELEPHONE (OUTPATIENT)
Dept: SPORTS MEDICINE | Facility: CLINIC | Age: 37
End: 2022-06-24
Payer: MEDICAID

## 2022-06-24 DIAGNOSIS — S83.511A RUPTURE OF ANTERIOR CRUCIATE LIGAMENT OF RIGHT KNEE, INITIAL ENCOUNTER: Primary | ICD-10-CM

## 2022-06-24 NOTE — TELEPHONE ENCOUNTER
LAST OFFICE VISIT : 5/09/2018        ICD-10-CM ICD-9-CM   1. Chronic atrial fibrillation (HCC) I48.2 427.31   2. Mitral valve disorder I05.9 394.9   3. Dyslipidemia E78.5 272.4   4. Orthostatic hypotension I95.1 458.0       Kei Tang is a 81 y.o. male with PAF, mitral valve disorder, HTN and hypercholesterolemia referred for 6 month follow up.      Cardiac risk factors: dyslipidemia, sedentary life style, male gender, hypertension  I have personally obtained the history from the patient. HISTORY OF PRESENTING ILLNESS      Mr. Antonieta Leon reports dizziness when he bends over. He has fallen a few times due to this concern. He reports intermittent swelling in his legs, particularly his right leg. He has significant right knee pain. He notes his systolic BP has been around 120 at home and at the [place]. Patient denies any exertional chest pain, dyspnea, palpitations, syncope, orthopnea, or paroxysmal nocturnal dyspnea. He is able to drive himself.        ACTIVE PROBLEM LIST     Patient Active Problem List    Diagnosis Date Noted    Bradycardia 05/22/2017    Dyslipidemia 11/11/2015    Disorder of skin 08/28/2013    Skin lesion 08/28/2013    Decreased hearing 08/28/2013    Paresthesia 08/28/2013    Osteopenia 08/28/2013    Nonspecific (abnormal) findings on radiological and other examination of other intrathoracic organs 08/28/2013    Personal history of malignant melanoma of skin 08/28/2013    Long-term (current) use of anticoagulants 08/28/2013    Orthostatic hypotension 08/16/2012    A-fib ([address] 02/17/2012    Mitral valve disorder 02/17/2012           PAST MEDICAL HISTORY     Past Medical History:   Diagnosis Date    Atrial fibrillation ([address]     Decreased hearing 8/28/2013    Disorder of skin 8/28/2013    GI AVM (gastrointestinal arteriovenous vascular malformation) 2005    Dr. Beatriz Bynum    Long-term (current) use of anticoagulants 8/28/2013    Mitral valve disorders(424.0)     Mixed Spoke with patient after receiving referral message from Maynor Walters NP. Was able to discuss and aid in scheduling clinic appointment. Time, date, and location were discussed and agreed on.   hyperlipidemia     Nonspecific (abnormal) findings on radiological and other examination of other intrathoracic organs 8/28/2013    Lung    Orthostatic hypotension     ELIANE (obstructive sleep apnea) 2010    Dr. Eleni Wayne Osteopenia 8/28/2013    Paresthesia 8/28/2013    Personal history of malignant melanoma of skin 8/28/2013    Skin lesion 8/28/2013           PAST SURGICAL HISTORY     No past surgical history on file. ALLERGIES     Allergies   Allergen Reactions    Cortisone Hives    Plavix [Clopidogrel] Unknown (comments)          FAMILY HISTORY     No family history on file. negative for cardiac disease       SOCIAL HISTORY     Social History     Socioeconomic History    Marital status:      Spouse name: Not on file    Number of children: Not on file    Years of education: Not on file    Highest education level: Not on file   Social Needs    Financial resource strain: Not on file    Food insecurity - worry: Not on file    Food insecurity - inability: Not on file    Transportation needs - medical: Not on file   [employer] needs - non-medical: Not on file   Occupational History    Not on file   Tobacco Use    Smoking status: Never Smoker    Smokeless tobacco: Never Used   Substance and Sexual Activity    Alcohol use: No    Drug use: Not on file    Sexual activity: Not on file   Other Topics Concern    Not on file   Social History Narrative    Not on file         MEDICATIONS     Current Outpatient Medications   Medication Sig    warfarin (COUMADIN) 5 mg tablet Take 1 Tab by mouth daily. Or as directed by [facility]   Indications: PREVENT THROMBOEMBOLISM IN CHRONIC ATRIAL FIBRILLATION    metoprolol tartrate (LOPRESSOR) 50 mg tablet Take 25 mg by mouth two (2) times a day.  cholecalciferol (VITAMIN D3) 1,000 unit tablet Take 1,000 Units by mouth Before breakfast, lunch, and dinner.  finasteride (PROSCAR) 5 mg tablet Take 5 mg by mouth daily.     Ferrous Fumarate 325 mg (106 mg iron) tab Take one tablet three times a week     No current facility-administered medications for this visit. I have reviewed the nurses notes, vitals, problem list, allergy list, medical history, family, social history and medications. REVIEW OF SYMPTOMS      General: Pt denies excessive weight gain or loss. Pt is able to conduct ADL's +fatigue  HEENT: Denies blurred vision, headaches, hearing loss, epistaxis and difficulty swallowing. Respiratory: Denies cough, congestion, wheezing or stridor. +GASTELUM  Cardiovascular: Denies precordial pain, palpitations, edema or PND  Gastrointestinal: Denies poor appetite, indigestion, abdominal pain or blood in stool  Genitourinary: Denies hematuria, dysuria, increased urinary frequency  Musculoskeletal: Denies joint pain or swelling from muscles or joints  Neurologic: Denies tremor, paresthesias, headache, or sensory motor disturbance  Psychiatric: Denies confusion, insomnia, depression  Integumentray: Denies rash, itching or ulcers. Hematologic: Denies easy bruising, bleeding     PHYSICAL EXAMINATION      Vitals:    11/14/18 1415   BP: 142/88   Pulse: 74   Resp: 16   Weight: 162 lb 6.4 oz (73.7 kg)   Height: 5' 11\" (1.803 m)     Re-check 11/14/17 - 120/78    General: Well developed, in no acute distress. HEENT: No jaundice, oral mucosa moist, no oral ulcers  Neck: Supple, no stiffness, no lymphadenopathy, supple  Heart:  Normal S1/S2 negative S3 or S4. Regular, no murmur, gallop or rub, no jugular venous distention  Respiratory: Clear bilaterally x 4, no wheezing or rales   Extremities:  Right leg - trace to 1+ pitting edema, Left leg - no significant edema  Musculoskeletal: No clubbing, no deformities  Neuro: A&Ox3, speech clear, gait stable, cooperative, no focal neurologic deficits  Skin: Skin color is normal. No rashes or lesions. Non diaphoretic, moist.    EKG 11/14/18 - SR with left axis deviation.       DIAGNOSTIC DATA     1. Echo 10/11/10-EF 60%, DD, GIFTY, mild MR TR    2. Holter monitor   06/20/17- predominant rhythm, sinus bradycardia 1 st degree AV block HR varied from  bpm rare PVCs, 8.1%     3. Cholesterol Profile  11/03/16- , HDL 63, ,            LABORATORY DATA            Lab Results   Component Value Date/Time    WBC 7.8 01/14/2017 12:22 PM    HGB 13.1 01/14/2017 12:22 PM    HCT 40.5 01/14/2017 12:22 PM    PLATELET 773 03/39/2419 12:22 PM    MCV 91.0 01/14/2017 12:22 PM      Lab Results   Component Value Date/Time    Sodium 138 01/14/2017 12:22 PM    Potassium 4.2 01/14/2017 12:22 PM    Chloride 105 01/14/2017 12:22 PM    CO2 26 01/14/2017 12:22 PM    Anion gap 7 01/14/2017 12:22 PM    Glucose 109 (H) 01/14/2017 12:22 PM    BUN 22 (H) 01/14/2017 12:22 PM    Creatinine 1.11 01/14/2017 12:22 PM    BUN/Creatinine ratio 20 01/14/2017 12:22 PM    GFR est AA >60 01/14/2017 12:22 PM    GFR est non-AA >60 01/14/2017 12:22 PM    Calcium 8.5 01/14/2017 12:22 PM           ASSESSMENT/RECOMMENDATIONS:.      1. PAF  -  - He does feel some irregularity at time, but  today on EKG he is in SR with left axis deviation.      2. MR  - Seems relatively stable. No further testing given his age.   Marleny  3. Anticoagulation   - No bleeding issues on Coumadin. 4. Occasional dizziness when bending over  - I think he has an inability to stablize his BP when he bends forward, increasing intraabdominal pressure and decreasing venous return. - I urged him to avoid bending forward without support  - I encouraged him to wear his compression stockings and   - I will order a limited echo to reassess LV function    Return in 6 months, sooner PRN    Orders Placed This Encounter    AMB POC EKG ROUTINE W/ 12 LEADS, INTER & REP     Order Specific Question:   Reason for Exam:     Answer:   afib        Follow-up Disposition:  Return in about 6 months (around 5/14/2019).       I have discussed the diagnosis with  Yair Brennan and the intended plan as seen in the above orders. Questions were answered concerning future plans. I have discussed medication side effects and warnings with the patient as well. Thank you,  No primary care provider on file. for involving me in the care of  Tere Gorman. Please do not hesitate to contact me for further questions/concerns. Written by Jorgito Blair, dictated by Carol Ann Ragsdale MD.    Keya Esparza MD, [address] [place] [address] Rd[address]      [facility], [address] [place] [address]     [address]      [phone] / [phone] Fax

## 2022-06-28 ENCOUNTER — OFFICE VISIT (OUTPATIENT)
Dept: SPORTS MEDICINE | Facility: CLINIC | Age: 37
End: 2022-06-28
Payer: MEDICAID

## 2022-06-28 VITALS — TEMPERATURE: 98 F | HEIGHT: 65 IN | WEIGHT: 113.44 LBS | BODY MASS INDEX: 18.9 KG/M2

## 2022-06-28 DIAGNOSIS — S83.241A ACUTE MEDIAL MENISCAL TEAR, RIGHT, INITIAL ENCOUNTER: ICD-10-CM

## 2022-06-28 DIAGNOSIS — S83.511A RUPTURE OF ANTERIOR CRUCIATE LIGAMENT OF RIGHT KNEE, INITIAL ENCOUNTER: Primary | ICD-10-CM

## 2022-06-28 PROCEDURE — 99999 PR PBB SHADOW E&M-EST. PATIENT-LVL II: CPT | Mod: PBBFAC,,, | Performed by: STUDENT IN AN ORGANIZED HEALTH CARE EDUCATION/TRAINING PROGRAM

## 2022-06-28 PROCEDURE — 99212 OFFICE O/P EST SF 10 MIN: CPT | Mod: PBBFAC,PN | Performed by: STUDENT IN AN ORGANIZED HEALTH CARE EDUCATION/TRAINING PROGRAM

## 2022-06-28 PROCEDURE — 1159F PR MEDICATION LIST DOCUMENTED IN MEDICAL RECORD: ICD-10-PCS | Mod: CPTII,,, | Performed by: STUDENT IN AN ORGANIZED HEALTH CARE EDUCATION/TRAINING PROGRAM

## 2022-06-28 PROCEDURE — 1160F PR REVIEW ALL MEDS BY PRESCRIBER/CLIN PHARMACIST DOCUMENTED: ICD-10-PCS | Mod: CPTII,,, | Performed by: STUDENT IN AN ORGANIZED HEALTH CARE EDUCATION/TRAINING PROGRAM

## 2022-06-28 PROCEDURE — 3008F PR BODY MASS INDEX (BMI) DOCUMENTED: ICD-10-PCS | Mod: CPTII,,, | Performed by: STUDENT IN AN ORGANIZED HEALTH CARE EDUCATION/TRAINING PROGRAM

## 2022-06-28 PROCEDURE — 3008F BODY MASS INDEX DOCD: CPT | Mod: CPTII,,, | Performed by: STUDENT IN AN ORGANIZED HEALTH CARE EDUCATION/TRAINING PROGRAM

## 2022-06-28 PROCEDURE — 1159F MED LIST DOCD IN RCRD: CPT | Mod: CPTII,,, | Performed by: STUDENT IN AN ORGANIZED HEALTH CARE EDUCATION/TRAINING PROGRAM

## 2022-06-28 PROCEDURE — 99999 PR PBB SHADOW E&M-EST. PATIENT-LVL II: ICD-10-PCS | Mod: PBBFAC,,, | Performed by: STUDENT IN AN ORGANIZED HEALTH CARE EDUCATION/TRAINING PROGRAM

## 2022-06-28 PROCEDURE — 99205 PR OFFICE/OUTPT VISIT, NEW, LEVL V, 60-74 MIN: ICD-10-PCS | Mod: S$PBB,,, | Performed by: STUDENT IN AN ORGANIZED HEALTH CARE EDUCATION/TRAINING PROGRAM

## 2022-06-28 PROCEDURE — 99205 OFFICE O/P NEW HI 60 MIN: CPT | Mod: S$PBB,,, | Performed by: STUDENT IN AN ORGANIZED HEALTH CARE EDUCATION/TRAINING PROGRAM

## 2022-06-28 PROCEDURE — 1160F RVW MEDS BY RX/DR IN RCRD: CPT | Mod: CPTII,,, | Performed by: STUDENT IN AN ORGANIZED HEALTH CARE EDUCATION/TRAINING PROGRAM

## 2022-06-28 NOTE — PROGRESS NOTES
Subjective:          Chief Complaint: Flora Madrid is a 36 y.o. female who had concerns including Pain of the Right Knee.    Flora Madrid is a 36 y.o. female  presents today for evaluation of right knee pain and instability.  She is referred by Maynor Walters NP.  About 1 month ago, on 05/30/2022, she had her right foot planted and a twisting mechanism to her knee.  She felt and heard a pop and crack.  She noticed immediate swelling and pain and difficulty bearing weight.  She rates her pain today as 10/10 and localizes it mainly over the joint lines, both medial and lateral.  The pain increases with weight-bearing as she takes her knee through range of motion.  She notes frequent episodes of instability when she is walking with buckling and gross instability when the knee is in a semi flexed position.  This has caused her to walk with a stiff-legged gait.  She denies any true mechanical symptoms such as catching or locking.  She does have a stabilization brace which she wears occasionally.  She thinks this has helped somewhat.  Denies any prior knee injuries or issues.        History reviewed. No pertinent past medical history.    Current Outpatient Medications on File Prior to Visit   Medication Sig Dispense Refill    lisdexamfetamine (VYVANSE) 20 MG capsule Take 20 mg by mouth every morning.      ondansetron (ZOFRAN) 4 MG tablet Take 1 tablet (4 mg total) by mouth every 6 (six) hours. (Patient not taking: Reported on 6/28/2022) 12 tablet 0    promethazine (PHENERGAN) 25 MG tablet Take 1 tablet (25 mg total) by mouth every 6 (six) hours as needed for Nausea. (Patient not taking: Reported on 6/28/2022) 15 tablet 0     No current facility-administered medications on file prior to visit.       Past Surgical History:   Procedure Laterality Date    CERVICAL BIOPSY  W/ LOOP ELECTRODE EXCISION      TONSILLECTOMY  1993    UMBILICAL HERNIA REPAIR N/A 1/4/2021    Procedure: REPAIR, HERNIA,  UMBILICAL;  Surgeon: Castro De Leon MD;  Location: [facility] OR;  Service: General;  Laterality: N/A;       Family History   Problem Relation Age of Onset    Ovarian cancer Mother     COPD Mother     Heart disease Mother     Prostate cancer Father     Heart attack Father     Heart disease Father     Stroke Father        Social History     Socioeconomic History    Marital status: Single   Tobacco Use    Smoking status: Never Smoker   Substance and Sexual Activity    Alcohol use: Yes     Comment: socially     Drug use: Yes     Types: Marijuana       Review of Systems   Constitutional: Negative.   HENT: Negative.    Eyes: Negative.    Cardiovascular: Negative.    Respiratory: Negative.    Endocrine: Negative.    Hematologic/Lymphatic: Negative.    Skin: Negative.    Musculoskeletal: Positive for joint pain (right knee ), joint swelling, muscle weakness and stiffness. Negative for arthritis, back pain, falls, muscle cramps, myalgias and neck pain.   Neurological: Negative.    Psychiatric/Behavioral: Negative.    Allergic/Immunologic: Negative.                    Objective:        General: Flora is well-developed, well-nourished, appears stated age, in no acute distress, alert and oriented to time, place and person.     General    Nursing note and vitals reviewed.  Constitutional: She is oriented to person, place, and time. She appears well-developed and well-nourished. No distress.   HENT:   Head: Normocephalic and atraumatic.   Nose: Nose normal.   Eyes: EOM are normal.   Cardiovascular: Intact distal pulses.    Pulmonary/Chest: Effort normal. No respiratory distress.   Neurological: She is alert and oriented to person, place, and time.   Psychiatric: She has a normal mood and affect. Her behavior is normal. Judgment and thought content normal.     General Musculoskeletal Exam   Gait: abnormal and antalgic       Right Knee Exam     Inspection   Erythema: absent  Scars: absent  Swelling: present  Effusion:  present  Deformity: absent  Bruising: absent    Tenderness   The patient is tender to palpation of the medial joint line (Lateral femoral condyle and lateral tibial plateau).    Range of Motion   Extension: 0   Flexion: 120     Tests   Meniscus   Kadeem:  Medial - positive (For both pain and click) Lateral - negative  Ligament Examination   Lachman: abnormal - grade II  MCL - Valgus: normal (0 to 2mm)  LCL - Varus: normal  Patella   Patellar apprehension: negative  Passive Patellar Tilt: neutral  Patellar Tracking: normal    Other   Sensation: normal    Left Knee Exam     Inspection   Erythema: absent  Scars: absent  Swelling: absent  Effusion: absent  Deformity: absent  Bruising: absent    Tenderness   The patient is experiencing no tenderness.     Range of Motion   Extension: -5   Flexion: 140     Tests   Meniscus   Kadeem:  Medial - negative Lateral - negative  Stability Lachman: normal (-1 to 2mm) PCL-Posterior Drawer: normal (0 to 2mm)  MCL - Valgus: normal (0 to 2mm)  LCL - Varus: normal (0 to 2mm)  Patella   Patellar apprehension: negative  Passive Patellar Tilt: neutral  Patellar Tracking: normal    Other   Sensation: normal    Muscle Strength   Right Lower Extremity   Quadriceps:  4/5   Hamstrin/5   Left Lower Extremity   Quadriceps:  5/5   Hamstrin/5     Vascular Exam     Right Pulses  Dorsalis Pedis:      2+  Posterior Tibial:      2+        Left Pulses  Dorsalis Pedis:      2+  Posterior Tibial:      2+        Edema  Right Lower Leg: absent  Left Lower Leg: absent      Imaging:  X-rays of bilateral knees from 2022 personally reviewed by me on that day.  These include weight-bearing AP, PA flexion, lateral, and Merchant views.  The joint spaces are well maintained in all 3 compartments.  There is no bony abnormality.    MRI of the right knee from 2022 personally reviewed by me 2022.  Complete disruption of the ACL.  The PCL is intact.  There is a low-grade injury the  proximal aspect of the MCL but the superficial and deep fibers are intact.  Likely grade 1 strain.  Lateral complex is intact.  The lateral meniscus is intact including at the root.  The medial meniscus had a vertical tear of the posterior horn, it appears that the root is intact.  Cartilage preserved in all 3 compartments.        Assessment:     Flora Madrid is a 36 y.o. female with right complete ACL tear and medial meniscal tear.  Encounter Diagnoses   Name Primary?    Rupture of anterior cruciate ligament of right knee, initial encounter Yes    Acute medial meniscal tear, right, initial encounter           Plan:         The diagnosis and treatment options were discussed at length with the patient her  all their questions were answered.  I showed her the MRI and the x-rays and explained the findings to them.    The treatment options for injury to the ACL were discussed at length with the patient and all of their questions were answered.  First we discussed non operative management.  I explained that this would include a course of physical therapy to work on knee, hip, core, and leg strengthening.  The goal this would be to improve the stability of the knee.  I explained that I would see the patient back for repeat evaluation to assess the stability of the knee.  If they were to have persistent instability of the knee that they do not feel like they can not live with, we would further discuss operative intervention.  We also discussed operative intervention.  I explained this would be ACL reconstruction.  Graft options including allograft and autograft options of hamstring tendon, bone patellar tendon bone, and quadriceps tendon were discussed at length and all their questions were answered.  The risks and benefits of each graft were also discussed.  After this discussion, they elected to proceed with Quadriceps Autograft.  I explained the medial and lateral menisci would be examined thoroughly and  partial meniscectomy versus repair would be performed as deemed appropriate.  The differences in the rehabilitation protocols were discussed and all their questions were answered.      We will plan on proceeding with ACL reconstruction with Quadriceps Autograft on 7/20/2022.  she does not need preoperative clearance from their PCP.  We will use ASA for DVT prophylaxis.    The risks, benefits and alternatives to surgery were discussed with the patient at great length.  These include bleeding, infection, vessel/nerve damage, pain, numbness, tingling, complex regional pain syndrome, hardware/surgical failure, need for further surgery, graft failure, graft retear, cosmetic deformity, failure of repair of other structures, damage to surrounding neurovascular structures, persistent instability, DVT, PE, arthritis and death.  Patient states an understanding and wishes to proceed with surgery.   All questions were answered.  No guarantees were implied or stated.  Informed consent was obtained today by the patient.    All of their questions were answered.  They will call the clinic with any questions or concerns in the interim.    Should the patient's symptoms worsen, persist, or fail to improve they should return for reevaluation and I would be happy to see them back anytime.        Orlando Malave M.D.     Please be aware that this note has been generated with the assistance of [employer] voice-to-text.  Please excuse any spelling or grammatical errors.    Thank you for choosing Dr. Orlando Malave for your sports medicine care. It is our goal to provide you with exceptional care that will help keep you healthy, active, and get you back in the game.     If you felt that you received exemplary care today, please consider leaving feedback for Dr. Malave on [employer] at [email or web address]    Please do not hesitate to reach out to us via email, phone, or MyChart with any questions,  concerns, or feedback.

## 2022-07-11 ENCOUNTER — TELEPHONE (OUTPATIENT)
Dept: SPORTS MEDICINE | Facility: CLINIC | Age: 37
End: 2022-07-11
Payer: MEDICAID

## 2022-07-11 NOTE — TELEPHONE ENCOUNTER
Spoke with pt, she said she had a VM that we wanted to reschedule her pre-op to 7/19. I rescheduled her pre-op from 7/12 to 7/19

## 2022-07-11 NOTE — TELEPHONE ENCOUNTER
----- Message from Desean Polo sent at 7/11/2022  2:45 PM CDT -----  Contact: pt  Type: Requesting to speak with nurse        Who Called: PT  Regarding: would like to confirm appt on 7/20  Would the patient rather a call back or a response via [facility]? Call back  Best Call Back Number: [phone]  Additional Information:

## 2022-07-15 NOTE — ANESTHESIA PAT ROS NOTE
07/15/2022  Flora Madrid is a 36 y.o., female.  All information is gathered per Chart review via Epic system only.    Pre-op Assessment          Review of Systems  Anesthesia Hx:  2021  Method of Intubation: Direct laryngoscopy; Mask Ventilation: Easy; Intubated: Postinduction; Blade: Damon #2; Airway Device Size: 7.5; Placement Verified By: Capnometry (BILAT BS and BILAT chest rise); Complicating Factors: None;   Denies Personal Hx of Anesthesia complications.   Social:  Non-Smoker, Social Alcohol Use       Planned Procedure: Procedure(s) (LRB):  RECONSTRUCTION, KNEE, ACL, ARTHROSCOPIC (Right)  ARTHROSCOPY,KNEE,WITH MENISCUS REPAIR (Right)  Requested Anesthesia Type:Regional  Surgeon: Orlando Malave MD  Service: Orthopedics  Known or anticipated Date of Surgery:7/20/2022    The patient has no apparent active cardiac condition (No unstable coronary Syndrome such as severe unstable angina or recent [<1 month] myocardial infarction, decompensated CHF, severe valvular   disease or significant arrhythmia)    Previous anesthesia records:GETA and No problems    5'5  113#  bmi 18

## 2022-07-18 ENCOUNTER — HOSPITAL ENCOUNTER (OUTPATIENT)
Dept: LAB | Facility: HOSPITAL | Age: 37
Discharge: HOME | End: 2022-07-18
Attending: STUDENT IN AN ORGANIZED HEALTH CARE EDUCATION/TRAINING PROGRAM
Payer: MEDICAID

## 2022-07-18 DIAGNOSIS — S83.511A RUPTURE OF ANTERIOR CRUCIATE LIGAMENT OF RIGHT KNEE, INITIAL ENCOUNTER: ICD-10-CM

## 2022-07-18 LAB — SARS-COV-2 RNA RESP QL NAA+PROBE: NOT DETECTED

## 2022-07-18 PROCEDURE — U0002 COVID-19 LAB TEST NON-CDC: HCPCS | Performed by: STUDENT IN AN ORGANIZED HEALTH CARE EDUCATION/TRAINING PROGRAM

## 2022-07-19 ENCOUNTER — OFFICE VISIT (OUTPATIENT)
Dept: SPORTS MEDICINE | Facility: CLINIC | Age: 37
End: 2022-07-19
Payer: MEDICAID

## 2022-07-19 ENCOUNTER — TELEPHONE (OUTPATIENT)
Dept: PHARMACY | Facility: CLINIC | Age: 37
End: 2022-07-19
Payer: MEDICAID

## 2022-07-19 ENCOUNTER — ANESTHESIA EVENT (OUTPATIENT)
Dept: SURGERY | Facility: HOSPITAL | Age: 37
End: 2022-07-19
Payer: MEDICAID

## 2022-07-19 VITALS — HEIGHT: 65 IN | BODY MASS INDEX: 19.94 KG/M2 | TEMPERATURE: 98 F | WEIGHT: 119.69 LBS

## 2022-07-19 DIAGNOSIS — S83.241A ACUTE MEDIAL MENISCAL TEAR, RIGHT, INITIAL ENCOUNTER: ICD-10-CM

## 2022-07-19 DIAGNOSIS — S83.511A RUPTURE OF ANTERIOR CRUCIATE LIGAMENT OF RIGHT KNEE, INITIAL ENCOUNTER: Primary | ICD-10-CM

## 2022-07-19 DIAGNOSIS — Z01.818 PREOP TESTING: ICD-10-CM

## 2022-07-19 PROCEDURE — 99999 PR PBB SHADOW E&M-EST. PATIENT-LVL III: CPT | Mod: PBBFAC,,, | Performed by: ORTHOPAEDIC SURGERY

## 2022-07-19 PROCEDURE — 99499 UNLISTED E&M SERVICE: CPT | Mod: S$PBB,,, | Performed by: ORTHOPAEDIC SURGERY

## 2022-07-19 PROCEDURE — 99213 OFFICE O/P EST LOW 20 MIN: CPT | Mod: PBBFAC,PN | Performed by: ORTHOPAEDIC SURGERY

## 2022-07-19 PROCEDURE — 1160F PR REVIEW ALL MEDS BY PRESCRIBER/CLIN PHARMACIST DOCUMENTED: ICD-10-PCS | Mod: CPTII,,, | Performed by: ORTHOPAEDIC SURGERY

## 2022-07-19 PROCEDURE — 99999 PR PBB SHADOW E&M-EST. PATIENT-LVL III: ICD-10-PCS | Mod: PBBFAC,,, | Performed by: ORTHOPAEDIC SURGERY

## 2022-07-19 PROCEDURE — 3008F PR BODY MASS INDEX (BMI) DOCUMENTED: ICD-10-PCS | Mod: CPTII,,, | Performed by: ORTHOPAEDIC SURGERY

## 2022-07-19 PROCEDURE — 1159F PR MEDICATION LIST DOCUMENTED IN MEDICAL RECORD: ICD-10-PCS | Mod: CPTII,,, | Performed by: ORTHOPAEDIC SURGERY

## 2022-07-19 PROCEDURE — 1160F RVW MEDS BY RX/DR IN RCRD: CPT | Mod: CPTII,,, | Performed by: ORTHOPAEDIC SURGERY

## 2022-07-19 PROCEDURE — 99499 NO LOS: ICD-10-PCS | Mod: S$PBB,,, | Performed by: ORTHOPAEDIC SURGERY

## 2022-07-19 PROCEDURE — 1159F MED LIST DOCD IN RCRD: CPT | Mod: CPTII,,, | Performed by: ORTHOPAEDIC SURGERY

## 2022-07-19 PROCEDURE — 3008F BODY MASS INDEX DOCD: CPT | Mod: CPTII,,, | Performed by: ORTHOPAEDIC SURGERY

## 2022-07-19 RX ORDER — SODIUM CHLORIDE 9 MG/ML
INJECTION, SOLUTION INTRAVENOUS CONTINUOUS
Status: CANCELLED | OUTPATIENT
Start: 2022-07-19

## 2022-07-19 RX ORDER — PROMETHAZINE HYDROCHLORIDE 25 MG/1
25 TABLET ORAL EVERY 6 HOURS PRN
Qty: 30 TABLET | Refills: 0 | Status: SHIPPED | OUTPATIENT
Start: 2022-07-19 | End: 2023-04-03 | Stop reason: CLARIF

## 2022-07-19 RX ORDER — CELECOXIB 200 MG/1
200 CAPSULE ORAL 2 TIMES DAILY WITH MEALS
Qty: 60 CAPSULE | Refills: 0 | Status: SHIPPED | OUTPATIENT
Start: 2022-07-19 | End: 2023-04-03 | Stop reason: CLARIF

## 2022-07-19 RX ORDER — OXYCODONE HYDROCHLORIDE 5 MG/1
5 TABLET ORAL EVERY 6 HOURS PRN
Qty: 28 TABLET | Refills: 0 | Status: SHIPPED | OUTPATIENT
Start: 2022-07-19 | End: 2022-07-23 | Stop reason: SDUPTHER

## 2022-07-19 RX ORDER — CEFAZOLIN SODIUM 2 G/50ML
2 SOLUTION INTRAVENOUS
Status: CANCELLED | OUTPATIENT
Start: 2022-07-19

## 2022-07-19 RX ORDER — ASPIRIN 81 MG/1
81 TABLET ORAL DAILY
Qty: 42 TABLET | Refills: 0 | Status: SHIPPED | OUTPATIENT
Start: 2022-07-19 | End: 2023-04-03 | Stop reason: CLARIF

## 2022-07-19 RX ORDER — METHOCARBAMOL 500 MG/1
500 TABLET, FILM COATED ORAL 3 TIMES DAILY PRN
Qty: 30 TABLET | Refills: 0 | Status: SHIPPED | OUTPATIENT
Start: 2022-07-19 | End: 2022-07-23 | Stop reason: SDUPTHER

## 2022-07-19 NOTE — H&P (VIEW-ONLY)
Flora Madrid  is here for a completion of her perioperative paperwork. she  Is scheduled to undergo    RIGHT Knee  Arthroscopy  Anterior cruciate ligament reconstruction with quadriceps tendon autograft, possible use of allograft  Medial meniscus repair versus partial meniscectomy  All other indicated procedures     on 07/20/2022.  She is a healthy individual and does not need clearance for this procedure.     Risks, indications and benefits of the surgical procedure were discussed with the patient. All questions with regard to surgery, rehab, expected return to functional activities, activities of daily living and recreational endeavors were answered to her satisfaction.    Discussed COVID-19 with the patient, they are aware of our current policies and procedures, were given the option of delaying surgery, and they elect to proceed.    Patient was informed and understands the risks of surgery are greater for patients with a current condition or history of heart disease, obesity, clotting disorders, recurrent infections, steroid use, current or past smoking, and factors such as sedentary lifestyle and noncompliance with medications, therapy or follow-up. The degree of the increased risk is hard to estimate with any degree of precision.    Once no other questions were asked, a brief history and physical exam was then performed.    PAST MEDICAL HISTORY: No past medical history on file.  PAST SURGICAL HISTORY:   Past Surgical History:   Procedure Laterality Date    CERVICAL BIOPSY  W/ LOOP ELECTRODE EXCISION      TONSILLECTOMY  1993    UMBILICAL HERNIA REPAIR N/A 1/4/2021    Procedure: REPAIR, HERNIA, UMBILICAL;  Surgeon: Castro De Leon MD;  Location: [facility];  Service: General;  Laterality: N/A;     FAMILY HISTORY:   Family History   Problem Relation Age of Onset    Ovarian cancer Mother     COPD Mother     Heart disease Mother     Prostate cancer Father     Heart attack Father     Heart disease Father      Stroke Father      SOCIAL HISTORY:   Social History     Socioeconomic History    Marital status: Single   Tobacco Use    Smoking status: Never Smoker   Substance and Sexual Activity    Alcohol use: Yes     Comment: socially     Drug use: Yes     Types: Marijuana       MEDICATIONS:   Current Outpatient Medications:     lisdexamfetamine (VYVANSE) 20 MG capsule, Take 20 mg by mouth every morning., Disp: , Rfl:     ondansetron (ZOFRAN) 4 MG tablet, Take 1 tablet (4 mg total) by mouth every 6 (six) hours., Disp: 12 tablet, Rfl: 0    promethazine (PHENERGAN) 25 MG tablet, Take 1 tablet (25 mg total) by mouth every 6 (six) hours as needed for Nausea., Disp: 15 tablet, Rfl: 0  ALLERGIES: Review of patient's allergies indicates:  No Known Allergies    Review of Systems   Constitution: Negative. Negative for chills, fever and night sweats.   HENT: Negative for congestion and headaches.    Eyes: Negative for blurred vision, left vision loss and right vision loss.   Cardiovascular: Negative for chest pain and syncope.   Respiratory: Negative for cough and shortness of breath.    Endocrine: Negative for polydipsia, polyphagia and polyuria.   Hematologic/Lymphatic: Negative for bleeding problem. Does not bruise/bleed easily.   Skin: Negative for dry skin, itching and rash.   Musculoskeletal: Negative for falls and muscle weakness.   Gastrointestinal: Negative for abdominal pain and bowel incontinence.   Genitourinary: Negative for bladder incontinence and nocturia.   Neurological: Negative for disturbances in coordination, loss of balance and seizures.   Psychiatric/Behavioral: Negative for depression. The patient does not have insomnia.    Allergic/Immunologic: Negative for hives and persistent infections.     PHYSICAL EXAM:  GEN: A&Ox3, WD WN NAD  HEENT: WNL  CHEST: CTAB, no W/R/R  HEART: RRR, no M/R/G   ABD: Soft, NT ND, BS x4 QUADS  MS: Refer to previous note for detailed MS exam  NEURO: CN II-XII intact       The  surgical consent was then reviewed with the patient, who agreed with all the contents of the consent form and it was signed. she was instructed to wait for a phone call from the anesthesia department prior to surgery to discuss past medical history, medications, and clearance. Also, informed she may be required to get additional testing per the anesthesia department prior to having surgery.     PHYSICAL THERAPY:  She was also instructed regarding physical therapy and will begin POD # 1-3. She was given a copy of the original prescription to schedule. Another copy of this prescription was also faxed to [facility] in [place].    POST OP CARE:instructions were reviewed including care of the wound and dressing after surgery and when she can shower.     PAIN MANAGEMENT: Flora Madrid was also given a pain management regime, which includes the TENS unit given to her by Rodney Leyva along with the education required for its use. She was also instructed regarding the Polar ice unit that will be in place after surgery and her postoperative pain medications.     PAIN MEDICATION:  Roxicodone (Oxycodone) 5 mg po q 4-6 hours prn pain   Phenergan 25 mg one p.o. q.4-6 hours prn nausea and vomiting.  Celebrex 200 mg BID with meals  Robaxin 500mg q 8 hours prn pain  Aspirin 81mg daily x 4 weeks for DVT prophylaxis starting on the evening after surgery.    Post op meds to be delivered bedside prior to discharge. Deliver to family if patient is in surgery at 5pm.   Patient denies history of seizures.     Patient will also use bilateral TEDs on lower extremities, SCDs during surgery, and early ambulation post-op. If the patient was previously taking 81mg baby aspirin, they were told to not take it will using the above stated aspirin and to restart the 81mg aspirin after completion of the aspirin dose.       Patient was also told to buy over the counter Prilosec medication and take it once daily for GI protection as long as they  are taking NSAIDs or Aspirin.    DVT prophylaxis was discussed with the patient today including risk factors for developing DVTs and history of DVTs. The patient was asked if any specific recommendations were given from the doctor/s that did pre-operative surgical clearance.      Patient was asked if they were taking or using OCP pills or devices. If they answered yes, then they were instructed to stop using OCPs at this pre-operative appointment until 2 months post-op to help prevent DVT development. They understand that there are other forms of birth control that do not involve hormones. They expressed understanding that ignoring/not following this instruction could result in a DVT which could turn into a deadly pulmonary embolism.      The patient was told that narcotic pain medications may make them drowsy and instructions were given to not sign legal documents, drive or operate heavy machinery, cars, or equipment while under the influence of narcotic medications.     My supervising physician Orlando Malave MD was also present during the appointment.  He discussed with the patient all the potential complications, risks, and benefits of their upcoming surgery.    As there were no other questions to be asked, she was given my business card along with Orlando Malave'MD marco business card if she has any questions or concerns prior to surgery or in the postop period.

## 2022-07-19 NOTE — H&P
Flora Madrid  is here for a completion of her perioperative paperwork. she  Is scheduled to undergo    RIGHT Knee  Arthroscopy  Anterior cruciate ligament reconstruction with quadriceps tendon autograft, possible use of allograft  Medial meniscus repair versus partial meniscectomy  All other indicated procedures     on 07/20/2022.  She is a healthy individual and does not need clearance for this procedure.     Risks, indications and benefits of the surgical procedure were discussed with the patient. All questions with regard to surgery, rehab, expected return to functional activities, activities of daily living and recreational endeavors were answered to her satisfaction.    Discussed COVID-19 with the patient, they are aware of our current policies and procedures, were given the option of delaying surgery, and they elect to proceed.    Patient was informed and understands the risks of surgery are greater for patients with a current condition or history of heart disease, obesity, clotting disorders, recurrent infections, steroid use, current or past smoking, and factors such as sedentary lifestyle and noncompliance with medications, therapy or follow-up. The degree of the increased risk is hard to estimate with any degree of precision.    Once no other questions were asked, a brief history and physical exam was then performed.    PAST MEDICAL HISTORY: No past medical history on file.  PAST SURGICAL HISTORY:   Past Surgical History:   Procedure Laterality Date    CERVICAL BIOPSY  W/ LOOP ELECTRODE EXCISION      TONSILLECTOMY  1993    UMBILICAL HERNIA REPAIR N/A 1/4/2021    Procedure: REPAIR, HERNIA, UMBILICAL;  Surgeon: Castro De Leon MD;  [facility];  Service: General;  Laterality: N/A;     FAMILY HISTORY:   Family History   Problem Relation Age of Onset    Ovarian cancer Mother     COPD Mother     Heart disease Mother     Prostate cancer Father     Heart attack Father     Heart disease Father      Stroke Father      SOCIAL HISTORY:   Social History     Socioeconomic History    Marital status: Single   Tobacco Use    Smoking status: Never Smoker   Substance and Sexual Activity    Alcohol use: Yes     Comment: socially     Drug use: Yes     Types: Marijuana       MEDICATIONS:   Current Outpatient Medications:     lisdexamfetamine (VYVANSE) 20 MG capsule, Take 20 mg by mouth every morning., Disp: , Rfl:     ondansetron (ZOFRAN) 4 MG tablet, Take 1 tablet (4 mg total) by mouth every 6 (six) hours., Disp: 12 tablet, Rfl: 0    promethazine (PHENERGAN) 25 MG tablet, Take 1 tablet (25 mg total) by mouth every 6 (six) hours as needed for Nausea., Disp: 15 tablet, Rfl: 0  ALLERGIES: Review of patient's allergies indicates:  No Known Allergies    Review of Systems   Constitution: Negative. Negative for chills, fever and night sweats.   HENT: Negative for congestion and headaches.    Eyes: Negative for blurred vision, left vision loss and right vision loss.   Cardiovascular: Negative for chest pain and syncope.   Respiratory: Negative for cough and shortness of breath.    Endocrine: Negative for polydipsia, polyphagia and polyuria.   Hematologic/Lymphatic: Negative for bleeding problem. Does not bruise/bleed easily.   Skin: Negative for dry skin, itching and rash.   Musculoskeletal: Negative for falls and muscle weakness.   Gastrointestinal: Negative for abdominal pain and bowel incontinence.   Genitourinary: Negative for bladder incontinence and nocturia.   Neurological: Negative for disturbances in coordination, loss of balance and seizures.   Psychiatric/Behavioral: Negative for depression. The patient does not have insomnia.    Allergic/Immunologic: Negative for hives and persistent infections.     PHYSICAL EXAM:  GEN: A&Ox3, WD WN NAD  HEENT: WNL  CHEST: CTAB, no W/R/R  HEART: RRR, no M/R/G   ABD: Soft, NT ND, BS x4 QUADS  MS: Refer to previous note for detailed MS exam  NEURO: CN II-XII intact       The  surgical consent was then reviewed with the patient, who agreed with all the contents of the consent form and it was signed. she was instructed to wait for a phone call from the anesthesia department prior to surgery to discuss past medical history, medications, and clearance. Also, informed she may be required to get additional testing per the anesthesia department prior to having surgery.     PHYSICAL THERAPY:  She was also instructed regarding physical therapy and will begin POD # 1-3. She was given a copy of the original prescription to schedule. Another copy of this prescription was also faxed to [facility] in [place].    POST OP CARE:instructions were reviewed including care of the wound and dressing after surgery and when she can shower.     PAIN MANAGEMENT: Flora Madrid was also given a pain management regime, which includes the TENS unit given to her by Rodney Leyva along with the education required for its use. She was also instructed regarding the Polar ice unit that will be in place after surgery and her postoperative pain medications.     PAIN MEDICATION:  Roxicodone (Oxycodone) 5 mg po q 4-6 hours prn pain   Phenergan 25 mg one p.o. q.4-6 hours prn nausea and vomiting.  Celebrex 200 mg BID with meals  Robaxin 500mg q 8 hours prn pain  Aspirin 81mg daily x 4 weeks for DVT prophylaxis starting on the evening after surgery.    Post op meds to be delivered bedside prior to discharge. Deliver to family if patient is in surgery at 5pm.   Patient denies history of seizures.     Patient will also use bilateral TEDs on lower extremities, SCDs during surgery, and early ambulation post-op. If the patient was previously taking 81mg baby aspirin, they were told to not take it will using the above stated aspirin and to restart the 81mg aspirin after completion of the aspirin dose.       Patient was also told to buy over the counter Prilosec medication and take it once daily for GI protection as long as they  are taking NSAIDs or Aspirin.    DVT prophylaxis was discussed with the patient today including risk factors for developing DVTs and history of DVTs. The patient was asked if any specific recommendations were given from the doctor/s that did pre-operative surgical clearance.      Patient was asked if they were taking or using OCP pills or devices. If they answered yes, then they were instructed to stop using OCPs at this pre-operative appointment until 2 months post-op to help prevent DVT development. They understand that there are other forms of birth control that do not involve hormones. They expressed understanding that ignoring/not following this instruction could result in a DVT which could turn into a deadly pulmonary embolism.      The patient was told that narcotic pain medications may make them drowsy and instructions were given to not sign legal documents, drive or operate heavy machinery, cars, or equipment while under the influence of narcotic medications.     My supervising physician Orlando Malave MD was also present during the appointment.  He discussed with the patient all the potential complications, risks, and benefits of their upcoming surgery.    As there were no other questions to be asked, she was given my business card along with Orlando Malave'MD marco business card if she has any questions or concerns prior to surgery or in the postop period.

## 2022-07-20 ENCOUNTER — HOSPITAL ENCOUNTER (OUTPATIENT)
Facility: HOSPITAL | Age: 37
Discharge: HOME | End: 2022-07-20
Attending: STUDENT IN AN ORGANIZED HEALTH CARE EDUCATION/TRAINING PROGRAM | Admitting: STUDENT IN AN ORGANIZED HEALTH CARE EDUCATION/TRAINING PROGRAM
Payer: MEDICAID

## 2022-07-20 ENCOUNTER — ANESTHESIA (OUTPATIENT)
Dept: SURGERY | Facility: HOSPITAL | Age: 37
End: 2022-07-20
Payer: MEDICAID

## 2022-07-20 VITALS
SYSTOLIC BLOOD PRESSURE: 113 MMHG | WEIGHT: 120 LBS | TEMPERATURE: 97 F | DIASTOLIC BLOOD PRESSURE: 62 MMHG | HEART RATE: 59 BPM | RESPIRATION RATE: 22 BRPM | BODY MASS INDEX: 19.99 KG/M2 | HEIGHT: 65 IN | OXYGEN SATURATION: 96 %

## 2022-07-20 DIAGNOSIS — S83.241A ACUTE MEDIAL MENISCAL TEAR, RIGHT, INITIAL ENCOUNTER: ICD-10-CM

## 2022-07-20 DIAGNOSIS — Z01.818 PREOP TESTING: ICD-10-CM

## 2022-07-20 DIAGNOSIS — S83.511A RUPTURE OF ANTERIOR CRUCIATE LIGAMENT OF RIGHT KNEE, INITIAL ENCOUNTER: ICD-10-CM

## 2022-07-20 LAB
B-HCG UR QL: NEGATIVE
CTP QC/QA: YES

## 2022-07-20 PROCEDURE — 29888 ARTHRS AID ACL RPR/AGMNTJ: CPT | Mod: RT,,, | Performed by: STUDENT IN AN ORGANIZED HEALTH CARE EDUCATION/TRAINING PROGRAM

## 2022-07-20 PROCEDURE — 25000003 PHARM REV CODE 250: Performed by: STUDENT IN AN ORGANIZED HEALTH CARE EDUCATION/TRAINING PROGRAM

## 2022-07-20 PROCEDURE — 63600175 PHARM REV CODE 636 W HCPCS: Performed by: NURSE ANESTHETIST, CERTIFIED REGISTERED

## 2022-07-20 PROCEDURE — 63600175 PHARM REV CODE 636 W HCPCS: Performed by: STUDENT IN AN ORGANIZED HEALTH CARE EDUCATION/TRAINING PROGRAM

## 2022-07-20 PROCEDURE — 63600175 PHARM REV CODE 636 W HCPCS: Performed by: ORTHOPAEDIC SURGERY

## 2022-07-20 PROCEDURE — 81025 URINE PREGNANCY TEST: CPT | Mod: ,,, | Performed by: ORTHOPAEDIC SURGERY

## 2022-07-20 PROCEDURE — 01400 ANES OPN/ARTHRS KNEE JT NOS: CPT | Performed by: STUDENT IN AN ORGANIZED HEALTH CARE EDUCATION/TRAINING PROGRAM

## 2022-07-20 PROCEDURE — 37000009 HC ANESTHESIA EA ADD 15 MINS: Performed by: STUDENT IN AN ORGANIZED HEALTH CARE EDUCATION/TRAINING PROGRAM

## 2022-07-20 PROCEDURE — 99900035 HC TECH TIME PER 15 MIN (STAT)

## 2022-07-20 PROCEDURE — D9220A PRA ANESTHESIA: Mod: ANES,,, | Performed by: ANESTHESIOLOGY

## 2022-07-20 PROCEDURE — 36000711: Performed by: STUDENT IN AN ORGANIZED HEALTH CARE EDUCATION/TRAINING PROGRAM

## 2022-07-20 PROCEDURE — 27201423 OPTIME MED/SURG SUP & DEVICES STERILE SUPPLY: Performed by: STUDENT IN AN ORGANIZED HEALTH CARE EDUCATION/TRAINING PROGRAM

## 2022-07-20 PROCEDURE — 81025 POCT URINE PREGNANCY: ICD-10-PCS | Mod: ,,, | Performed by: ORTHOPAEDIC SURGERY

## 2022-07-20 PROCEDURE — D9220A PRA ANESTHESIA: ICD-10-PCS | Mod: ANES,,, | Performed by: ANESTHESIOLOGY

## 2022-07-20 PROCEDURE — 29888 PR KNEE SCOPE,AID ANT CRUCIATE REPAIR: ICD-10-PCS | Mod: RT,,, | Performed by: STUDENT IN AN ORGANIZED HEALTH CARE EDUCATION/TRAINING PROGRAM

## 2022-07-20 PROCEDURE — 25000003 PHARM REV CODE 250: Performed by: ORTHOPAEDIC SURGERY

## 2022-07-20 PROCEDURE — C1713 ANCHOR/SCREW BN/BN,TIS/BN: HCPCS | Performed by: STUDENT IN AN ORGANIZED HEALTH CARE EDUCATION/TRAINING PROGRAM

## 2022-07-20 PROCEDURE — 37000008 HC ANESTHESIA 1ST 15 MINUTES: Performed by: STUDENT IN AN ORGANIZED HEALTH CARE EDUCATION/TRAINING PROGRAM

## 2022-07-20 PROCEDURE — 25000003 PHARM REV CODE 250: Performed by: ANESTHESIOLOGY

## 2022-07-20 PROCEDURE — 27200651 HC AIRWAY, LMA: Performed by: ANESTHESIOLOGY

## 2022-07-20 PROCEDURE — D9220A PRA ANESTHESIA: Mod: CRNA,,, | Performed by: NURSE ANESTHETIST, CERTIFIED REGISTERED

## 2022-07-20 PROCEDURE — 81025 URINE PREGNANCY TEST: CPT | Performed by: ORTHOPAEDIC SURGERY

## 2022-07-20 PROCEDURE — 36000710: Performed by: STUDENT IN AN ORGANIZED HEALTH CARE EDUCATION/TRAINING PROGRAM

## 2022-07-20 PROCEDURE — 71000033 HC RECOVERY, INTIAL HOUR: Performed by: STUDENT IN AN ORGANIZED HEALTH CARE EDUCATION/TRAINING PROGRAM

## 2022-07-20 PROCEDURE — 71000015 HC POSTOP RECOV 1ST HR: Performed by: STUDENT IN AN ORGANIZED HEALTH CARE EDUCATION/TRAINING PROGRAM

## 2022-07-20 PROCEDURE — 25000003 PHARM REV CODE 250: Performed by: NURSE ANESTHETIST, CERTIFIED REGISTERED

## 2022-07-20 PROCEDURE — 94761 N-INVAS EAR/PLS OXIMETRY MLT: CPT

## 2022-07-20 PROCEDURE — 71000039 HC RECOVERY, EACH ADD'L HOUR: Performed by: STUDENT IN AN ORGANIZED HEALTH CARE EDUCATION/TRAINING PROGRAM

## 2022-07-20 PROCEDURE — D9220A PRA ANESTHESIA: ICD-10-PCS | Mod: CRNA,,, | Performed by: NURSE ANESTHETIST, CERTIFIED REGISTERED

## 2022-07-20 PROCEDURE — 29882 ARTHRS KNE SRG MNISC RPR M/L: CPT | Mod: 51,RT,, | Performed by: STUDENT IN AN ORGANIZED HEALTH CARE EDUCATION/TRAINING PROGRAM

## 2022-07-20 PROCEDURE — 63600175 PHARM REV CODE 636 W HCPCS: Performed by: ANESTHESIOLOGY

## 2022-07-20 PROCEDURE — 29882 PR KNEE SCOPE,MED OR LAT MENIS REPAIR: ICD-10-PCS | Mod: 51,RT,, | Performed by: STUDENT IN AN ORGANIZED HEALTH CARE EDUCATION/TRAINING PROGRAM

## 2022-07-20 DEVICE — DEVICE FIX TIGHTROPE FIBERTAG: Type: IMPLANTABLE DEVICE | Site: KNEE | Status: FUNCTIONAL

## 2022-07-20 DEVICE — ANCHOR FIBERSTITCH MENIS REP: Type: IMPLANTABLE DEVICE | Site: KNEE | Status: FUNCTIONAL

## 2022-07-20 DEVICE — SYS SWIVELOCK ANCH 4.75X19.1MM: Type: IMPLANTABLE DEVICE | Site: KNEE | Status: FUNCTIONAL

## 2022-07-20 DEVICE — BUTTON TIGHTROPE ABS 14MM RND: Type: IMPLANTABLE DEVICE | Site: KNEE | Status: FUNCTIONAL

## 2022-07-20 DEVICE — IMPLANTABLE DEVICE: Type: IMPLANTABLE DEVICE | Site: KNEE | Status: FUNCTIONAL

## 2022-07-20 RX ORDER — SODIUM CHLORIDE 0.9 % (FLUSH) 0.9 %
10 SYRINGE (ML) INJECTION
Status: DISCONTINUED | OUTPATIENT
Start: 2022-07-20 | End: 2022-07-20 | Stop reason: HOSPADM

## 2022-07-20 RX ORDER — PROPOFOL 10 MG/ML
VIAL (ML) INTRAVENOUS
Status: DISCONTINUED | OUTPATIENT
Start: 2022-07-20 | End: 2022-07-20

## 2022-07-20 RX ORDER — FENTANYL CITRATE 50 UG/ML
INJECTION, SOLUTION INTRAMUSCULAR; INTRAVENOUS
Status: DISCONTINUED | OUTPATIENT
Start: 2022-07-20 | End: 2022-07-20

## 2022-07-20 RX ORDER — KETAMINE HYDROCHLORIDE 100 MG/ML
INJECTION, SOLUTION INTRAMUSCULAR; INTRAVENOUS
Status: DISCONTINUED | OUTPATIENT
Start: 2022-07-20 | End: 2022-07-20

## 2022-07-20 RX ORDER — VANCOMYCIN HYDROCHLORIDE 1 G/20ML
INJECTION, POWDER, LYOPHILIZED, FOR SOLUTION INTRAVENOUS
Status: DISCONTINUED | OUTPATIENT
Start: 2022-07-20 | End: 2022-07-20 | Stop reason: HOSPADM

## 2022-07-20 RX ORDER — HYDROCODONE BITARTRATE AND ACETAMINOPHEN 5; 325 MG/1; MG/1
1 TABLET ORAL EVERY 4 HOURS PRN
Status: DISCONTINUED | OUTPATIENT
Start: 2022-07-20 | End: 2022-07-20 | Stop reason: HOSPADM

## 2022-07-20 RX ORDER — CELECOXIB 200 MG/1
400 CAPSULE ORAL
Status: COMPLETED | OUTPATIENT
Start: 2022-07-20 | End: 2022-07-20

## 2022-07-20 RX ORDER — FENTANYL CITRATE 50 UG/ML
25 INJECTION, SOLUTION INTRAMUSCULAR; INTRAVENOUS EVERY 5 MIN PRN
Status: COMPLETED | OUTPATIENT
Start: 2022-07-20 | End: 2022-07-20

## 2022-07-20 RX ORDER — FAMOTIDINE 10 MG/ML
INJECTION INTRAVENOUS
Status: DISCONTINUED | OUTPATIENT
Start: 2022-07-20 | End: 2022-07-20

## 2022-07-20 RX ORDER — ONDANSETRON 2 MG/ML
INJECTION INTRAMUSCULAR; INTRAVENOUS
Status: DISCONTINUED | OUTPATIENT
Start: 2022-07-20 | End: 2022-07-20

## 2022-07-20 RX ORDER — ACETAMINOPHEN 325 MG/1
650 TABLET ORAL EVERY 4 HOURS PRN
Status: DISCONTINUED | OUTPATIENT
Start: 2022-07-20 | End: 2022-07-20 | Stop reason: HOSPADM

## 2022-07-20 RX ORDER — ONDANSETRON 2 MG/ML
4 INJECTION INTRAMUSCULAR; INTRAVENOUS EVERY 12 HOURS PRN
Status: DISCONTINUED | OUTPATIENT
Start: 2022-07-20 | End: 2022-07-20 | Stop reason: HOSPADM

## 2022-07-20 RX ORDER — HYDROMORPHONE HYDROCHLORIDE 1 MG/ML
0.2 INJECTION, SOLUTION INTRAMUSCULAR; INTRAVENOUS; SUBCUTANEOUS EVERY 5 MIN PRN
Status: CANCELLED | OUTPATIENT
Start: 2022-07-20

## 2022-07-20 RX ORDER — CEFAZOLIN SODIUM 1 G/3ML
2 INJECTION, POWDER, FOR SOLUTION INTRAMUSCULAR; INTRAVENOUS
Status: DISCONTINUED | OUTPATIENT
Start: 2022-07-20 | End: 2022-07-20 | Stop reason: HOSPADM

## 2022-07-20 RX ORDER — OXYCODONE HYDROCHLORIDE 5 MG/1
10 TABLET ORAL EVERY 4 HOURS PRN
Status: DISCONTINUED | OUTPATIENT
Start: 2022-07-20 | End: 2022-07-20 | Stop reason: HOSPADM

## 2022-07-20 RX ORDER — METOCLOPRAMIDE HYDROCHLORIDE 5 MG/ML
5 INJECTION INTRAMUSCULAR; INTRAVENOUS EVERY 6 HOURS PRN
Status: DISCONTINUED | OUTPATIENT
Start: 2022-07-20 | End: 2022-07-20 | Stop reason: HOSPADM

## 2022-07-20 RX ORDER — METHOCARBAMOL 500 MG/1
1000 TABLET, FILM COATED ORAL ONCE
Status: COMPLETED | OUTPATIENT
Start: 2022-07-20 | End: 2022-07-20

## 2022-07-20 RX ORDER — DEXAMETHASONE SODIUM PHOSPHATE 4 MG/ML
INJECTION, SOLUTION INTRA-ARTICULAR; INTRALESIONAL; INTRAMUSCULAR; INTRAVENOUS; SOFT TISSUE
Status: DISCONTINUED | OUTPATIENT
Start: 2022-07-20 | End: 2022-07-20

## 2022-07-20 RX ORDER — HALOPERIDOL 5 MG/ML
0.5 INJECTION INTRAMUSCULAR EVERY 10 MIN PRN
Status: DISCONTINUED | OUTPATIENT
Start: 2022-07-20 | End: 2022-07-20 | Stop reason: HOSPADM

## 2022-07-20 RX ORDER — MIDAZOLAM HYDROCHLORIDE 1 MG/ML
0.5 INJECTION INTRAMUSCULAR; INTRAVENOUS
Status: DISCONTINUED | OUTPATIENT
Start: 2022-07-20 | End: 2022-07-20 | Stop reason: HOSPADM

## 2022-07-20 RX ORDER — OXYCODONE HYDROCHLORIDE 5 MG/1
5 TABLET ORAL
Status: DISCONTINUED | OUTPATIENT
Start: 2022-07-20 | End: 2022-07-20 | Stop reason: HOSPADM

## 2022-07-20 RX ORDER — ACETAMINOPHEN 500 MG
1000 TABLET ORAL
Status: COMPLETED | OUTPATIENT
Start: 2022-07-20 | End: 2022-07-20

## 2022-07-20 RX ORDER — SODIUM CHLORIDE 9 MG/ML
INJECTION, SOLUTION INTRAVENOUS CONTINUOUS
Status: DISCONTINUED | OUTPATIENT
Start: 2022-07-20 | End: 2022-07-20 | Stop reason: HOSPADM

## 2022-07-20 RX ORDER — FENTANYL CITRATE 50 UG/ML
25 INJECTION, SOLUTION INTRAMUSCULAR; INTRAVENOUS EVERY 5 MIN PRN
Status: DISCONTINUED | OUTPATIENT
Start: 2022-07-20 | End: 2022-07-20 | Stop reason: HOSPADM

## 2022-07-20 RX ORDER — ROPIVACAINE HYDROCHLORIDE 2 MG/ML
INJECTION, SOLUTION EPIDURAL; INFILTRATION; PERINEURAL CONTINUOUS
Status: DISCONTINUED | OUTPATIENT
Start: 2022-07-20 | End: 2022-07-20 | Stop reason: HOSPADM

## 2022-07-20 RX ORDER — LIDOCAINE HYDROCHLORIDE 20 MG/ML
INJECTION INTRAVENOUS
Status: DISCONTINUED | OUTPATIENT
Start: 2022-07-20 | End: 2022-07-20

## 2022-07-20 RX ORDER — EPINEPHRINE 1 MG/ML
INJECTION, SOLUTION INTRACARDIAC; INTRAMUSCULAR; INTRAVENOUS; SUBCUTANEOUS
Status: DISCONTINUED | OUTPATIENT
Start: 2022-07-20 | End: 2022-07-20 | Stop reason: HOSPADM

## 2022-07-20 RX ADMIN — SODIUM CHLORIDE: 0.9 INJECTION, SOLUTION INTRAVENOUS at 08:07

## 2022-07-20 RX ADMIN — ONDANSETRON 4 MG: 2 INJECTION INTRAMUSCULAR; INTRAVENOUS at 08:07

## 2022-07-20 RX ADMIN — FENTANYL CITRATE 100 MCG: 50 INJECTION, SOLUTION INTRAMUSCULAR; INTRAVENOUS at 08:07

## 2022-07-20 RX ADMIN — DEXAMETHASONE SODIUM PHOSPHATE 8 MG: 4 INJECTION, SOLUTION INTRAMUSCULAR; INTRAVENOUS at 08:07

## 2022-07-20 RX ADMIN — HALOPERIDOL LACTATE 0.5 MG: 5 INJECTION, SOLUTION INTRAMUSCULAR at 12:07

## 2022-07-20 RX ADMIN — FENTANYL CITRATE 50 MCG: 50 INJECTION, SOLUTION INTRAMUSCULAR; INTRAVENOUS at 09:07

## 2022-07-20 RX ADMIN — KETAMINE HYDROCHLORIDE 20 MG: 100 INJECTION, SOLUTION, CONCENTRATE INTRAMUSCULAR; INTRAVENOUS at 08:07

## 2022-07-20 RX ADMIN — Medication: at 11:07

## 2022-07-20 RX ADMIN — FENTANYL CITRATE 25 MCG: 50 INJECTION, SOLUTION INTRAMUSCULAR; INTRAVENOUS at 10:07

## 2022-07-20 RX ADMIN — CELECOXIB 400 MG: 200 CAPSULE ORAL at 08:07

## 2022-07-20 RX ADMIN — OXYCODONE 10 MG: 5 TABLET ORAL at 11:07

## 2022-07-20 RX ADMIN — FENTANYL CITRATE 25 MCG: 50 INJECTION, SOLUTION INTRAMUSCULAR; INTRAVENOUS at 11:07

## 2022-07-20 RX ADMIN — SODIUM CHLORIDE, SODIUM GLUCONATE, SODIUM ACETATE, POTASSIUM CHLORIDE, MAGNESIUM CHLORIDE, SODIUM PHOSPHATE, DIBASIC, AND POTASSIUM PHOSPHATE: .53; .5; .37; .037; .03; .012; .00082 INJECTION, SOLUTION INTRAVENOUS at 10:07

## 2022-07-20 RX ADMIN — FENTANYL CITRATE 25 MCG: 50 INJECTION INTRAMUSCULAR; INTRAVENOUS at 11:07

## 2022-07-20 RX ADMIN — ACETAMINOPHEN 1000 MG: 500 TABLET ORAL at 08:07

## 2022-07-20 RX ADMIN — CEFAZOLIN 2 G: 330 INJECTION, POWDER, FOR SOLUTION INTRAMUSCULAR; INTRAVENOUS at 09:07

## 2022-07-20 RX ADMIN — SODIUM CHLORIDE: 0.9 INJECTION, SOLUTION INTRAVENOUS at 07:07

## 2022-07-20 RX ADMIN — PROPOFOL 200 MG: 10 INJECTION, EMULSION INTRAVENOUS at 08:07

## 2022-07-20 RX ADMIN — LIDOCAINE HYDROCHLORIDE 75 MG: 20 INJECTION, SOLUTION INTRAVENOUS at 08:07

## 2022-07-20 RX ADMIN — MIDAZOLAM HYDROCHLORIDE 2 MG: 1 INJECTION, SOLUTION INTRAMUSCULAR; INTRAVENOUS at 08:07

## 2022-07-20 RX ADMIN — FAMOTIDINE 20 MG: 10 INJECTION, SOLUTION INTRAVENOUS at 08:07

## 2022-07-20 RX ADMIN — METHOCARBAMOL 1000 MG: 500 TABLET ORAL at 11:07

## 2022-07-20 NOTE — PLAN OF CARE
VSS.  Patient tolerating oral liquids without difficulty.   No apparent s&s of distress noted at this time, no complaints voiced at this time.   Discharge instructions reviewed with patient and family with good verbal feedback received.   Post op medications delivered, DME at bedside.  Patient ready for discharge.

## 2022-07-20 NOTE — PLAN OF CARE
Pre op complete. Patient resting comfortably. Call light in reach. Significant other to bedside shortly, will take belongings. Patient needs crutches for discharge/home use.

## 2022-07-20 NOTE — ANESTHESIA PREPROCEDURE EVALUATION
07/20/2022  Flora Madrid is a 36 y.o., female.      Pre-op Assessment    I have reviewed the Patient Summary Reports.     I have reviewed the Nursing Notes. I have reviewed the NPO Status.      Review of Systems  Anesthesia Hx:  2021  Method of Intubation: Direct laryngoscopy; Mask Ventilation: Easy; Intubated: Postinduction; Blade: Damon #2; Airway Device Size: 7.5; Placement Verified By: Capnometry (BILAT BS and BILAT chest rise); Complicating Factors: None;   Denies Personal Hx of Anesthesia complications.   Social:  Non-Smoker, Social Alcohol Use    Hematology/Oncology:     Oncology Normal     EENT/Dental:EENT/Dental Normal   Cardiovascular:   Exercise tolerance: good    Pulmonary:  Pulmonary Normal    Renal/:  Renal/ Normal     Hepatic/GI:  Hepatic/GI Normal    Neurological:  Neurology Normal    Endocrine:  Endocrine Normal        Physical Exam  General: Well nourished and Cooperative    Airway:  Mallampati: II   Mouth Opening: Normal  Tongue: Normal    Dental:  Intact    Chest/Lungs:  Clear to auscultation, Normal Respiratory Rate    Heart:  Rate: Normal        Anesthesia Plan  Type of Anesthesia, risks & benefits discussed:    Anesthesia Type: Gen ETT, MAC, Regional  Intra-op Monitoring Plan: Standard ASA Monitors  Post Op Pain Control Plan: multimodal analgesia, IV/PO Opioids PRN and peripheral nerve block  Induction:  IV  Informed Consent: Informed consent signed with the Patient and all parties understand the risks and agree with anesthesia plan.  All questions answered.   ASA Score: 1    Ready For Surgery From Anesthesia Perspective.     .

## 2022-07-20 NOTE — BRIEF OP NOTE
[place] - Surgery ([facility])  Brief Operative Note    Surgery Date: 7/20/2022     Surgeon(s) and Role:     * Orlando Malave MD - Primary    Assisting Surgeon: None    Pre-op Diagnosis:  Rupture of anterior cruciate ligament of right knee, initial encounter [S83.511A]  Acute medial meniscal tear, right, initial encounter [S83.241A]    Post-op Diagnosis:  Post-Op Diagnosis Codes:     * Rupture of anterior cruciate ligament of right knee, initial encounter [S83.511A]     * Acute medial meniscal tear, right, initial encounter [S83.241A]    Procedure(s) (LRB):  RECONSTRUCTION, KNEE, ACL, ARTHROSCOPIC (Right)  ARTHROSCOPY,KNEE,WITH MENISCUS REPAIR (Right)    Anesthesia: Regional    Operative Findings: Complete rupture of the ACL.  Nondisplaced bucket-handle tear of the posterior horn extending towards the midbody of the medial meniscus    Estimated Blood Loss: * No values recorded between 7/20/2022  9:11 AM and 7/20/2022 11:11 AM *         Specimens:   Specimen (24h ago, onward)            None            Discharge Note    OUTCOME: Patient tolerated treatment/procedure well without complication and is now ready for discharge.    DISPOSITION: Home or Self Care    FINAL DIAGNOSIS:  Rupture of anterior cruciate ligament of right knee    FOLLOWUP: In clinic    DISCHARGE INSTRUCTIONS:    Discharge Procedure Orders   Diet general     Call MD for:  temperature >100.4     Call MD for:  persistent nausea and vomiting     Call MD for:  severe uncontrolled pain     Call MD for:  difficulty breathing, headache or visual disturbances     Call MD for:  redness, tenderness, or signs of infection (pain, swelling, redness, odor or green/yellow discharge around incision site)     Call MD for:  hives     Call MD for:  persistent dizziness or light-headedness     Call MD for:  extreme fatigue

## 2022-07-20 NOTE — OP NOTE
DATE OF PROCEDURE: 07/20/2022    SURGEON: Orlando Malave MD    ASSISTANT:  1. Jaden STILL     PREOPERATIVE DIAGNOSIS:    1. Complete right ACL rupture  2. Right medial meniscal tear     POSTOPERATIVE DIAGNOSIS:   1. Complete right ACL rupture  2. Right medial meniscal tear      PROCEDURE PERFORMED:   1. Right ACL reconstruction with quadriceps tendon autograft  2. Right medial meniscus repair      ANESTHESIA: General with indwelling adductor canal block and catheter    BLOOD LOSS: 100cc.     IMPLANTS:  Implant Name Type Inv. Item Serial No.  Lot No. LRB No. Used Action   SUT ACL FIBERTAG TIGHTROPE ABS - [id number]  SUT ACL FIBERTAG TIGHTROPE ABS  [employer] [id number] Right 1 Implanted   ACL FiberTag TightRope Implant    [employer] [id number] Right 1 Implanted   ANCHOR FIBERSTITCH MENIS REP - [id number]  ANCHOR FIBERSTITCH MENIS REP  [employer] [id number] Right 2 Implanted   ANCHOR FIBERSTITCH MENIS REP - [id number]  ANCHOR FIBERSTITCH MENIS REP  [employer] [id number] Right 3 Implanted   BUTTON TIGHTROPE ABS 14MM RND - [id number]  BUTTON TIGHTROPE ABS 14MM RND  [employer] [id number] Right 1 Implanted   SYS SWIVELOCK ANCH 4.75X19.1MM - [id number]  SYS SWIVELOCK ANCH 4.75X19.1MM  [employer] [id number] Right 1 Implanted         DRAINS: None.     COMPLICATIONS: None.     INTRA-OPERATIVE FINDINGS:  Exam under anesthesia with full passive range of motion equal to contralateral knee from-5-145 degrees.  2B Lachman's with grade 2 pivot shift.  Negative posterior drawer stable to varus and valgus stress at 12:30 a.m. degrees.  Diagnostic arthroscopy with pristine cartilage in all 3 compartments.  ACL was completely ruptured.  Medial meniscus had a nondisplaced bucket-handle tear of the posterior horn extending into the midbody.  The root was not involved and was stable.  Lateral meniscus was intact and stable.  PCL was intact    GRAFT SIZE:  Length: 70mm  Width: 10    TUNNEL SIZE:  Femoral Length: 25mm  Femoral Diameter: 10mm  Tibial  Length 45mm  Tibial Diameter: 10mm    BRIEF INDICATION OF MEDICAL NECESSITY:   Flora Madrid is a 36 y.o. female establish care in our clinic after sustaining an injury to the right knee.  Advanced imaging including an MRI was obtained demonstrating complete tear of the ACL as well as a medial meniscal tear of the posterior horn. After discussion with the patient was determined the best course of action would be to proceed to the operating room for knee arthroscopy with ACL reconstruction using quadriceps tendon autograft.  We discussed the need for possible additional procedures such as meniscectomy versus meniscus repair, chondroplasty, and other cartilage procedures.  Procedures, as well as the risks, benefits, and alternatives, were explained to the patient in great detail all questions were answered.  Informed consent was obtained.      PROCEDURE IN DETAIL:   The patient was identified in the preoperative holding area and the site was marked.  she was taken to the operating room where there placed in the supine position on the operating room table.  Anesthetic agents were then administered.  Exam under anesthesia performed, see the detailed findings above.  A nonsterile tourniquet was then applied to the upper thigh.  The right leg was then prepped and draped in standard sterile fashion.  A time-out was undertaken around the patient, site, surgery, surgeon, and administration preoperative antibiotics.  All was agreed upon we proceeded.    We began with our quadriceps tendon graft harvest.  A 3 cm longitudinal incision was made beginning at superior pole of the patella and extending proximally for 3 cm. Sharp dissection was carried through the skin subcutaneous tissue.  Tate scissors were then used to create full-thickness flaps for retraction.  A lap sponge was then used to remove the remaining soft tissue from on top of the quadriceps tendon.  The overlying fascia was incised and cut using Tate scissors both  proximally and distally.  A  was then used to ensure we had adequate tendon length greater than 70 mm.  The size 11mm quad pro was chosen for this patient.  The rim of this was marked using a marker and this was placed over the tendon to shruthi our graft width at the superior pole of the patella.  This distal aspect of the tendon was then elevated off of the superior pole of the patella using a 15 blade knife and taken approximately 1-1.5 cm proximally.  A FiberLoop was then passed with several throws this distal aspect of the tendon.  The suture was passed through the quad prior harvest device.  The graft is harvested using an rotation movement of the harvester 90° clockwise and then followed back to neutral 90° counter-clockwise until we achieved appropriate graft length of 70mm.  At this point the graft was then pulled back out of the harvest device and threaded through the amputation component.  The graft was amputated.  The graft was then taken to the back table for preparation in standard fashion.  The quadriceps tendon was then closed in interrupted fashion using 0 Vicryl sutures that were passed using a scorpion.  This was done from proximal to distal and tied sequentially.    We are now be ready to begin with our arthroscopic portion of the procedure.  Standard anterior lateral arthroscopic portal was established and diagnostic arthroscopy was performed, see the detailed findings above.  A standard anterior medial arthroscopic portal was then established under spinal needle guidance to ensure appropriate trajectory for ACL drilling.    Upon entering the notch the ACL was noted to be completely ruptured.    We then examined the medial compartment.  A valgus stress was then applied to the knee and the camera was placed into the medial compartment and this compartment was examined.  The medial meniscus was thoroughly examined and noted to be torn of the posterior horn extending towards the midbody at  the junction of red red and red-white zones.  This was a bucket-handle tear that was not displaced.  The root of the medial meniscus was probed and noted to be intact and stable.   The tear was determined to be repairable.  The medial compartment was a little tight making it difficult for instrumentation.  A MCL release was performed using a spinal needle until there was a positive drive-through sign to allow us to instrument without damaging the articular cartilage.  This was repaired using 3 all-inside fiber Stitch devices.  First, we prepared our meniscus for repair.  A sled was then reduced to the medial portal a ball spike rasp was used.  The periphery was then punctured with a spinal needle to allow bleeding to increase healing.  We then placed 2 all-inside devices through the medial portal.  First we placed 1 posteriorly in a simple stitch fashion.  We then placed a 2nd in the midportion of the tear.  These were tightened down and then cut using a flush cutter.  We then moved the camera to the medial portal and worked through the lateral portal.  A slide was used to introduce a 3rd all inside device from the lateral portal towards the end of the tear at the midbody.  This was placed in the simple circumferential fashion, tightened, and then cut.  The camera was then returned to the medial portal.  We ensured that our repair made the entirety of the tear tight and stable.  We then checked stability the posterior root and remained stable.  Final images were then taken.  The medial tibial plateau and medial femoral condyle were examined for cartilage deficits.  There were noted to be intact with no chondral damage.    Next, we proceeded to the lateral compartment.  The leg was then placed in a figure 4 position and the lateral compartment was examined.  The lateral meniscus was thoroughly examined and noted to be intact.  The root of the lateral meniscus was probed and noted to be intact and stable.   The  lateral tibial plateau and lateral femoral condyle were examined for cartilage deficits.  There were noted to be intact with no chondral damage.    At this point we are now ready to proceed with our ACL reconstruction.  We began on the femoral side.  The remnant of the ACL was debrided using an arthroscopic shaver.  The soft tissue was removed the lateral wall of the notch using a combination of an arthroscopic shaver and radiofrequency ablator.  An awl was placed in medial portal to the anatomic footprint of the ACL on the femoral side.  This was gently mallet it into place.  The awl was then removed and the camera was moved to the medial portal and we were able to directly visualize the correct placement of the femoral tunnel.  The camera was then returned to the lateral portal and a flexible guide pin was placed through the medial portal into the hole created by the awl.  The knee was then hyperflexed and the guide pin was advanced to the lateral cortex of the femur and out of the lateral thigh.  The total femur length was noted to be 35mm.  We then used a 10mm Reamer to a length of 25mm.  The bone debris was removed using an arthroscopic shaver.  There was noted to be an intact posterior wall of the tunnel.  This was confirmed by placing the camera through the medial portal for direct visualization.  The camera was then returned to the lateral portal in the shuttling suture was placed through the end of the guide pin which was pulled to the lateral aspect of the thigh and secured with a hemostat.    We now turned our attention to the tibia.  The tibial guide was placed through the medial portal onto the anatomic tibial footprint of the ACL in the bullet was advanced to the anterior medial aspect of the shin.  Once our site was marked with the bullet this was removed and a #10 blade was used to create a 4 cm longitudinal incision.  Sharp dissection was carried through skin subcutaneous tissue.  Soft tissue was  removed from the anterior medial cortex of the tibia.  Once this was completed the tibial guide was then placed again.  The bullet was advanced to the anteromedial cortex of the tibia and measured a length of 45mm.  A guide pin was then advanced through the bullet and into the knee joint.  This exited in the anatomic tibial footprint of the ACL.  The tibial guide was removed.  A 10mm Reamer was then used to create our tibial tunnel.  Great care was taken minimally or breaching the cortex within the knee to ensure we did not damage the medial or lateral femoral condyle.    The shuttling suture was then retrieved through the tibial tunnel.  The graft was then passed in standard fashion transtibially.  Once the femoral button past the cortex and was flipped.  This was verified by both pulling on the graft and ensuring it did not subside back as well as under fluoroscopy.  Once this was verified we began to toggle the suture limbs to pull our graft into the femoral tunnel.  Once achieved appropriate depth we turned our attention to the tibia.  The knee was then cycled.  With appropriate tension applied to the tibial sutures the leg was extended to match the extension of the contralateral knee.  A posterior drawer force was applied.  The tibia was fixed with 14 mm tibial button.  We advanced the tibial sutures in till we reached a firm endpoint.  A Lachman's was performed which was negative.    We then performed a Lachman's which was negative.  The camera was then reinserted through the lateral portal and we visualized the graft.  This was taken through full range of motion there was noted to be no impingement during range of motion.  The graft noted to be taut upon probing.    We then tied the sutures from the tibial tight ropes.  These were then loaded into a SwiveLock and placed as a backup fixation.  The sutures from the femoral button tight rope were also tied to create a closed loop construct.    At this point the  procedure was complete and all instruments were removed.  The incisions were irrigated with saline solution and 500 mg of vancomycin powder was distributed between them.  The quadriceps graft harvest site as well as the longitudinal tibial incision were closed with 3-0 Vicryl suture for the subcutaneous layer and running 4-0 Monocryl and Dermabond.  The arthroscopic portals were closed with 4-0 Monocryl and Dermabond.  Sterile dressings were then applied the patient was placed in a hinged knee brace locked in extension.  The patient was awakened from the anesthetic agents.  The procedure was complete without complication and tolerated well by the patient.  Was then taken to the postanesthesia care unit in stable condition    POSTOPERATIVE PLAN:  She will be nonweightbearing for 2 weeks, followed by 50% weight-bearing for 2 weeks, followed by weight-bearing as tolerated.  For the 1st 6 weeks she will be in a T scope brace.  This will be locked in full extension during all mobilization during this time, otherwise she can range from 0-90 degrees.  She will follow the ACL reconstruction protocol with meniscus repair.  Return to clinic in 2 weeks for postoperative wound check and suture removal.

## 2022-07-20 NOTE — TRANSFER OF CARE
"Anesthesia Transfer of Care Note    Patient: Flora Madrid    Procedure(s) Performed: Procedure(s) (LRB):  RECONSTRUCTION, KNEE, ACL, ARTHROSCOPIC (Right)  ARTHROSCOPY,KNEE,WITH MENISCUS REPAIR (Right)    Patient location: PACU    Anesthesia Type: general    Transport from OR: Transported from OR on 6-10 L/min O2 by face mask with adequate spontaneous ventilation    Post pain: adequate analgesia    Post assessment: no apparent anesthetic complications and tolerated procedure well    Post vital signs: stable    Level of consciousness: awake    Nausea/Vomiting: no nausea/vomiting    Complications: none    Transfer of care protocol was followed      Last vitals:   Visit Vitals  /64 (BP Location: Left arm, Patient Position: Lying)   Pulse 90   Temp 36.6 °C (97.9 °F) (Oral)   Resp 18   Ht 5' 5" (1.651 m)   Wt 54.4 kg (120 lb)   LMP 07/06/2022 (Approximate)   SpO2 97%   Breastfeeding No   BMI 19.97 kg/m²     "

## 2022-07-21 NOTE — ANESTHESIA POSTPROCEDURE EVALUATION
Anesthesia Post Evaluation    Patient: Flora Madrid    Procedure(s) Performed: Procedure(s) (LRB):  RECONSTRUCTION, KNEE, ACL, ARTHROSCOPIC (Right)  ARTHROSCOPY,KNEE,WITH MENISCUS REPAIR (Right)    Final Anesthesia Type: general      Patient location during evaluation: PACU  Patient participation: Yes- Able to Participate  Level of consciousness: awake and alert  Post-procedure vital signs: reviewed and stable  Pain management: adequate  Airway patency: patent    PONV status at discharge: No PONV  Anesthetic complications: no      Cardiovascular status: blood pressure returned to baseline  Respiratory status: unassisted  Hydration status: euvolemic  Follow-up not needed.          Vitals Value Taken Time   /70 07/20/22 1202   Temp 36.3 °C (97.3 °F) 07/20/22 1130   Pulse 69 07/20/22 1211   Resp 53 07/20/22 1211   SpO2 100 % 07/20/22 1210   Vitals shown include unvalidated device data.      Event Time   Out of Recovery 12:25:00         Pain/Ana Score: Pain Rating Prior to Med Admin: 8 (7/20/2022 11:45 AM)  Pain Rating Post Med Admin: 8 (7/20/2022 11:38 AM)  Ana Score: 9 (7/20/2022 11:17 AM)

## 2022-07-22 ENCOUNTER — PATIENT MESSAGE (OUTPATIENT)
Dept: SPORTS MEDICINE | Facility: CLINIC | Age: 37
End: 2022-07-22
Payer: MEDICAID

## 2022-07-22 DIAGNOSIS — Z98.890 S/P ACL RECONSTRUCTION: Primary | ICD-10-CM

## 2022-07-23 DIAGNOSIS — S83.511A RUPTURE OF ANTERIOR CRUCIATE LIGAMENT OF RIGHT KNEE, INITIAL ENCOUNTER: ICD-10-CM

## 2022-07-23 DIAGNOSIS — Z01.818 PREOP TESTING: ICD-10-CM

## 2022-07-23 DIAGNOSIS — S83.241A ACUTE MEDIAL MENISCAL TEAR, RIGHT, INITIAL ENCOUNTER: ICD-10-CM

## 2022-07-25 RX ORDER — OXYCODONE HYDROCHLORIDE 5 MG/1
5 TABLET ORAL EVERY 6 HOURS PRN
Qty: 14 TABLET | Refills: 0 | Status: SHIPPED | OUTPATIENT
Start: 2022-07-25 | End: 2022-08-01 | Stop reason: ALTCHOICE

## 2022-07-25 RX ORDER — METHOCARBAMOL 500 MG/1
500 TABLET, FILM COATED ORAL 3 TIMES DAILY PRN
Qty: 30 TABLET | Refills: 0 | Status: SHIPPED | OUTPATIENT
Start: 2022-07-25 | End: 2022-08-09 | Stop reason: ALTCHOICE

## 2022-07-27 DIAGNOSIS — S83.511A RUPTURE OF ANTERIOR CRUCIATE LIGAMENT OF RIGHT KNEE, INITIAL ENCOUNTER: ICD-10-CM

## 2022-07-27 DIAGNOSIS — S83.241A ACUTE MEDIAL MENISCAL TEAR, RIGHT, INITIAL ENCOUNTER: ICD-10-CM

## 2022-07-27 DIAGNOSIS — Z01.818 PREOP TESTING: ICD-10-CM

## 2022-07-27 RX ORDER — OXYCODONE HYDROCHLORIDE 5 MG/1
5 TABLET ORAL EVERY 6 HOURS PRN
Qty: 14 TABLET | Refills: 0 | Status: CANCELLED | OUTPATIENT
Start: 2022-07-27

## 2022-07-27 RX ORDER — METHOCARBAMOL 500 MG/1
500 TABLET, FILM COATED ORAL 3 TIMES DAILY PRN
Qty: 30 TABLET | Refills: 0 | Status: CANCELLED | OUTPATIENT
Start: 2022-07-27

## 2022-07-28 ENCOUNTER — CLINICAL SUPPORT (OUTPATIENT)
Dept: REHABILITATION | Facility: HOSPITAL | Age: 37
End: 2022-07-28
Payer: MEDICAID

## 2022-07-28 ENCOUNTER — PATIENT MESSAGE (OUTPATIENT)
Dept: SPORTS MEDICINE | Facility: CLINIC | Age: 37
End: 2022-07-28
Payer: MEDICAID

## 2022-07-28 DIAGNOSIS — S83.511A RUPTURE OF ANTERIOR CRUCIATE LIGAMENT OF RIGHT KNEE, INITIAL ENCOUNTER: ICD-10-CM

## 2022-07-28 DIAGNOSIS — Z01.818 PREOP TESTING: ICD-10-CM

## 2022-07-28 DIAGNOSIS — Z98.890 S/P ACL RECONSTRUCTION: ICD-10-CM

## 2022-07-28 DIAGNOSIS — S83.241A ACUTE MEDIAL MENISCAL TEAR, RIGHT, INITIAL ENCOUNTER: ICD-10-CM

## 2022-07-28 PROCEDURE — 97161 PT EVAL LOW COMPLEX 20 MIN: CPT

## 2022-07-28 NOTE — PLAN OF CARE
Physical Therapy Initial Evaluation     Name: Flora Madrid  [facility] Number: [id number]    Flora is a 36 y.o. female evaluated on 07/28/2022.     Diagnosis:   Encounter Diagnosis   Name Primary?    S/P ACL reconstruction      Physician: MD Bronson Clements PA-C  Treatment Orders: PT Eval and Treat  Per op report:   She will be NWB for 2 weeks, followed by 2 weeks followed by 50% weight bearing for 2 weeks followed by weightbearing as tolerated. For the 1st 6 weeks she will be in a T-scope brace. This will be locked in full extension during all mobilization during this time, otherwise she can range from 0-90 degrees. She will follow the ACL reconstruction protocol with meniscus repair. Return to clinic in 2 weeks for postoperative wound check and suture removal.   PT has contacted ortho office for complete ACL protocol and they will be sending to our office.   No past medical history on file.  Current Outpatient Medications   Medication Sig    aspirin (ECOTRIN) 81 MG EC tablet Take 1 tablet (81 mg total) by mouth once daily.    celecoxib (CELEBREX) 200 MG capsule Take 1 capsule (200 mg total) by mouth 2 (two) times daily with meals.    lisdexamfetamine (VYVANSE) 20 MG capsule Take 20 mg by mouth every morning.    methocarbamoL (ROBAXIN) 500 MG Tab Take 1 tablet (500 mg total) by mouth 3 (three) times daily as needed (muscle spasms).    ondansetron (ZOFRAN) 4 MG tablet Take 1 tablet (4 mg total) by mouth every 6 (six) hours.    oxyCODONE (ROXICODONE) 5 MG immediate release tablet Take 1 tablet (5 mg total) by mouth every 6 (six) hours as needed for Pain.    promethazine (PHENERGAN) 25 MG tablet Take 1 tablet (25 mg total) by mouth every 6 (six) hours as needed for Nausea.    promethazine (PHENERGAN) 25 MG tablet Take 1 tablet (25 mg total) by mouth every 6 (six) hours as needed for Nausea.     No current facility-administered  medications for this visit.     Review of patient's allergies indicates:  No Known Allergies  Precautions: s/p ACL reconstruction protocol    Subjective     Patient States: patient with surgery on 7/20/2022 for (R) ACL reconstruction with quadriceps tendon autograft, (R) medial meniscus repair. Patient was unable to attend PT until today. Patient reports she has been ambulating around home with crutches but was unsure of wearing brace and weight bearing precautions. Patient reports her significant other has been assisting her as needed. No steps to enter home; 2 nd story of home but can live on first floor. 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 Patient reports injury occurred when she was doing some work around house and had a twisting injury with (R) knee popping.   Onset: 7/20/2022 s/p ACL reconstruction, (R) medical meniscus repair    Pain Scale: Flora rates pain on a scale of 0-10 to be 7/10.  Patient Goals: ambulate without pain, ambulate without an assistive device, perform work related activities without pain, return to recreational activites and learn HEP      Objective     Observation: Patient ambulated into therapy gym using axillary crutches TTWB on (R) LE without T-scope brace on. Patient accompanied by significant other.     Posture: Patient with no gross postural deficits noted at this time.     Range of Motion: Knee   Left Right   Flexion: 140 90   Extension 0 -25         ROM:  Ankle Left Right   Dorsiflexion WNL  (-10) degrees   Plantarflexion WNL  (48) degrees   Inversion WNL  (25) degrees   Eversion WNL  (20) degrees          Lower Extremity Strength  Right LE  Left LE    Knee extension: Not assessed due to recent surgery Knee extension: 5/5   Knee flexion: Not assessed due to recent surgery Knee flexion: 5/5   Hip flexion: Unable to perform SLR Hip flexion: 5/5   Hip extension:  3-/5 Hip extension: 5/5   Hip abduction: 2/5 Hip abduction: 5/5   Hip adduction: 2/5  Hip adduction 5/5   Ankle dorsiflexion: 3-/5 Ankle dorsiflexion: 5/5   Ankle plantarflexion: 3/5 Ankle plantarflexion: 5/5     Joint Mobility: impaired mobility (R) knee s/p ACL reconstruction  Palpation: Tenderness to palpation (R) knee  Sensation: intact to light touch    Edema:  Left: absent  Right: moderate    Girth   Left Right   10 cm above joint line 36 cm 39 cm   Joint Line 33 cm 37 cm   10 cm below joint line 32 cm 33 cm       Gait: Julius ambulated with axillary crutches. PT adjusted patient's crutches due to excessive pressure in axillary area.  Level of Assistance: independent  Patient displays TTWB gait and patient orders are for NWB (R) LE. Patient putting excess pressure on axillary area when standing and during gait with patient needing cueing for proper posture and technique with ambulation. . Patient did ambulate NWB with crutches in clinic once clinic adjusted.   Balance: No loss of balance noted with ambulation in clinic.       LOWER EXTREMITY FUNCTIONAL SCALE  11/80         1. Any of your usual work, housework or school activities   0/4  2. Your usual hobbies, sporting     0/4  3. Getting in and out of tub      2/4  4. Walking between rooms      2/4  5. Putting on shoes or socks      1/4  6. Squatting        0/4  7. Lifting an object from the ground      0/4  8. Performing light activities around the home   1/4  9. Performing heavy activities around the home   0/4  10. Getting in and out of car      1/4  11. Walking 2 blocks       0/4  12.Walking a mile       0/4  13. Getting up and down 1 flight of stairs    0/4  14. Standing for 1 hour      0/4  15. Sitting for an hour       3/4  16. Running on even ground      0/4  17. Running on uneven ground     0/4  18. Making sharp turns when running fast    0/4  19. Hopping        0/4  20. Rolling over in bed       1/4      TREATMENT     Time In: 0815  Time Out: 0855    PT Evaluation Completed? Yes  Discussed Plan of Care with patient: Yes    Written  Home Exercises Provided: see  for details.   Flora demonstrated good understanding of the education provided. Patient demonstrated good return demonstration of skill of exercises.  Education: patient educated on NWB status (R) LE, patient educated on wearing of (R) knee brace. Patient educated on HEP. Patient educated on icing knee and elevation of (R) LE to impact pain control and swelling.   ASSESSMENT   Pt prognosis is Good.  Pt will benefit from skilled outpatient physical therapy to address the above stated deficits, provide pt/family education and to maximize pt's level of independence.     Medical necessity is demonstrated by the following IMPAIRMENTS/PROBLEMS:  1. Decreased ROM  2. Decreased strength  3. Gait deviations  4. Decreased tolerance of functional activities  5. Increased pain      Pt's spiritual, cultural and educational needs considered and pt agreeable to plan of care and goals as stated below:     Anticipated Barriers for physical therapy:     Short Term GOALS: 4 weeks. Pt agrees with goals set.  1. Patient demonstrates independence with HEP.   2. Patient will follow all precautions per protocol.  3. Patient will perform SLR without extension lag.     Long Term GOALS: 8  weeks. Pt agrees with goals set.  1. Patient will ambulate with least restrictive device with no gait deviations in accordance with protocol.   2. Patient will demonstrate (R) knee ROM WNL.   3. Patient will improve (R) knee strength to 4/5 or greater.  4. Patient will improve LEFS score to 60 or greater.  5. Patient will report (R) knee pain 2/10 or less.       PLAN     Outpatient physical therapy 3 times weekly to include: pt ed, hep, therapeutic exercises, neuromuscular re-education/ balance exercises, manual therapy and modalities prn. Cont PT for  8 weeks. Pt may be seen by PTA as part of the rehabilitation team.   Certification dates: 7/28/2022-9/23/2022    Therapist: Evette Hernandez, PT    I certify the need  for these services furnished under this plan of treatment and while under my care.  ____________________________________ Physician/Referring Practitioner   Date of Signature

## 2022-07-28 NOTE — TELEPHONE ENCOUNTER
----- Message from Zamzam Richards sent at 7/27/2022  4:25 PM CDT -----  Type:  Needs Medical Advice    Who Called: pt  Symptoms (please be specific): pt says she has been requesting refills for medication and she is currently out and is in pain  Pharmacy name and phone #:  [id number] - [address] [facility] [address] [facility]   Phone:  [phone]  Fax:  [phone]    Would the patient rather a call back or a response via [facility]? call  Best Call Back Number: [phone]  Additional Information: n/a

## 2022-07-28 NOTE — TELEPHONE ENCOUNTER
Spoke with patient and she stated that she had not picked up the medication from the pharmacy and was going to pick it up today.

## 2022-08-01 ENCOUNTER — CLINICAL SUPPORT (OUTPATIENT)
Dept: REHABILITATION | Facility: HOSPITAL | Age: 37
End: 2022-08-01
Payer: MEDICAID

## 2022-08-01 DIAGNOSIS — Z98.890 S/P ACL RECONSTRUCTION: Primary | ICD-10-CM

## 2022-08-01 DIAGNOSIS — G89.18 POST-OP PAIN: Primary | ICD-10-CM

## 2022-08-01 PROCEDURE — 97110 THERAPEUTIC EXERCISES: CPT

## 2022-08-01 RX ORDER — HYDROCODONE BITARTRATE AND ACETAMINOPHEN 5; 325 MG/1; MG/1
1 TABLET ORAL EVERY 6 HOURS PRN
Qty: 21 TABLET | Refills: 0 | Status: SHIPPED | OUTPATIENT
Start: 2022-08-01 | End: 2023-04-03 | Stop reason: CLARIF

## 2022-08-01 RX ORDER — OXYCODONE HYDROCHLORIDE 5 MG/1
5 TABLET ORAL EVERY 6 HOURS PRN
Qty: 14 TABLET | Refills: 0 | OUTPATIENT
Start: 2022-08-01

## 2022-08-01 NOTE — PROGRESS NOTES
"                                                    Physical Therapy Daily Note     Name: Flora Madrid  Clinic Number: [id number]  Diagnosis:   Encounter Diagnosis   Name Primary?    S/P ACL reconstruction Yes     Physician: DELONTE Garcia MD    Precautions: see complete protocol in .    Per op report:   She will be NWB for 2 weeks, followed by 2 weeks followed by 50% weight bearing for 2 weeks followed by weightbearing as tolerated. For the 1st 6 weeks she will be in a T-scope brace. This will be locked in full extension during all mobilization during this time, otherwise she can range from 0-90 degrees. She will follow the ACL reconstruction protocol with meniscus repair. Return to clinic in 2 weeks for postoperative wound check and suture removal.   Visit #: 1 of 24  PTA Visit #: 0  Time In: 1111  Time Out: 1153    Subjective     Pt reports: Patient report some (R) knee pain today. Patient reports performing exercises at home as instructed during evaluation. Patient came into clinic not wearing (R) T-scope brace but NWB on (R) LE using crutches. Patient reported she wore the brace most of the morning and "felt like taking it off."   Pain Scale: Flora rates pain on a scale of 0-10 to be 5 currently.    Objective     Flora received individual therapeutic exercises to develop strength, endurance, ROM, flexibility, posture and core stabilization for 32 minutes including:  3x10 SLR 4 ways AAROM for supine SLR only, SAQ, AP    The patient received the following supervised modalities after being cleared for contradictions: MFAC to (R) quads x15 minutes with 2 second ramp up and down and 10 second on and off with illiciting strong muscle contraction with patient performing quad set.     Written Home Exercises Provided: provided initial evaluation.     Education provided re: exercise technique, MFAC, protocols/precautions  Flora verbalized fair understanding of " education provided.   No spiritual or educational barriers to learning provided    Assessment     Patient tolerated treatment fair. PT reinforced patient protocol and precautions. Patient verbalized understanding. No increase in pain symptoms over course of treatment.   This is a 36 y.o. female referred to outpatient physical therapy and presents with a medical diagnosis of s/p (R) ACL reconstruction with meniscus repair and demonstrates limitations as described in the problem list. Pt prognosis is Good. Pt will continue to benefit from skilled outpatient physical therapy to address the deficits listed in the problem list, provide pt/family education and to maximize pt's level of independence in the home and community environment.     Goals as follows:  Short Term GOALS: 4 weeks. Pt agrees with goals set.  1. Patient demonstrates independence with HEP.   2. Patient will follow all precautions per protocol.  3. Patient will perform SLR without extension lag.      Long Term GOALS: 8  weeks. Pt agrees with goals set.  1. Patient will ambulate with least restrictive device with no gait deviations in accordance with protocol.   2. Patient will demonstrate (R) knee ROM WNL.   3. Patient will improve (R) knee strength to 4/5 or greater.  4. Patient will improve LEFS score to 60 or greater.  5. Patient will report (R) knee pain 2/10 or less.      Plan     Continue with established Plan of Care towards PT goals.    Therapist: Evette Hernandez, PT  8/1/2022

## 2022-08-02 ENCOUNTER — OFFICE VISIT (OUTPATIENT)
Dept: SPORTS MEDICINE | Facility: CLINIC | Age: 37
End: 2022-08-02
Payer: MEDICAID

## 2022-08-02 VITALS — WEIGHT: 120 LBS | BODY MASS INDEX: 19.99 KG/M2 | HEIGHT: 65 IN

## 2022-08-02 DIAGNOSIS — Z98.890 S/P ACL RECONSTRUCTION: Primary | ICD-10-CM

## 2022-08-02 PROCEDURE — 99024 PR POST-OP FOLLOW-UP VISIT: ICD-10-PCS | Mod: ,,, | Performed by: ORTHOPAEDIC SURGERY

## 2022-08-02 PROCEDURE — 1159F PR MEDICATION LIST DOCUMENTED IN MEDICAL RECORD: ICD-10-PCS | Mod: CPTII,,, | Performed by: ORTHOPAEDIC SURGERY

## 2022-08-02 PROCEDURE — 99024 POSTOP FOLLOW-UP VISIT: CPT | Mod: ,,, | Performed by: ORTHOPAEDIC SURGERY

## 2022-08-02 PROCEDURE — 1160F RVW MEDS BY RX/DR IN RCRD: CPT | Mod: CPTII,,, | Performed by: ORTHOPAEDIC SURGERY

## 2022-08-02 PROCEDURE — 1159F MED LIST DOCD IN RCRD: CPT | Mod: CPTII,,, | Performed by: ORTHOPAEDIC SURGERY

## 2022-08-02 PROCEDURE — 99999 PR PBB SHADOW E&M-EST. PATIENT-LVL III: CPT | Mod: PBBFAC,,, | Performed by: ORTHOPAEDIC SURGERY

## 2022-08-02 PROCEDURE — 3008F PR BODY MASS INDEX (BMI) DOCUMENTED: ICD-10-PCS | Mod: CPTII,,, | Performed by: ORTHOPAEDIC SURGERY

## 2022-08-02 PROCEDURE — 3008F BODY MASS INDEX DOCD: CPT | Mod: CPTII,,, | Performed by: ORTHOPAEDIC SURGERY

## 2022-08-02 PROCEDURE — 99213 OFFICE O/P EST LOW 20 MIN: CPT | Mod: PBBFAC,PN | Performed by: ORTHOPAEDIC SURGERY

## 2022-08-02 PROCEDURE — 99999 PR PBB SHADOW E&M-EST. PATIENT-LVL III: ICD-10-PCS | Mod: PBBFAC,,, | Performed by: ORTHOPAEDIC SURGERY

## 2022-08-02 PROCEDURE — 1160F PR REVIEW ALL MEDS BY PRESCRIBER/CLIN PHARMACIST DOCUMENTED: ICD-10-PCS | Mod: CPTII,,, | Performed by: ORTHOPAEDIC SURGERY

## 2022-08-02 NOTE — PROGRESS NOTES
Subjective:          Chief Complaint: Flora Madrid is a 36 y.o. female who had no chief complaint listed for this encounter.    HPI     Patient is here s/p Right ACL recon for 2 week post-op appointment. She reports 5/10 pain and is taking Robaxin for pain. She denies any nausea, vomiting, fever, chills, shortness of breath, or calf pain. She is attending formal physical therapy at [facility] in [place].  She is currently not wearing her T-scope knee brace and states she left in the car.    PROCEDURE PERFORMED by Orlando Malave MD on 07/20/2022:   1. Right ACL reconstruction with quadriceps tendon autograft  2. Right medial meniscus repair      Review of Systems   Constitutional: Negative.   HENT: Negative.    Eyes: Negative.    Cardiovascular: Negative.    Respiratory: Negative.    Endocrine: Negative.    Hematologic/Lymphatic: Negative.    Skin: Negative.    Musculoskeletal: Positive for joint pain, muscle weakness and stiffness. Negative for falls.   Neurological: Negative.    Psychiatric/Behavioral: Negative.    Allergic/Immunologic: Negative.                    Objective:        General: Flora is well-developed, well-nourished, appears stated age, in no acute distress, alert and oriented to time, place and person.     General    Constitutional: She is oriented to person, place, and time. She appears well-developed and well-nourished. No distress.   Cardiovascular: Intact distal pulses.    Neurological: She is alert and oriented to person, place, and time.   Psychiatric: She has a normal mood and affect. Her behavior is normal. Thought content normal.           Right Knee Exam     Inspection   Erythema: absent  Scars: present  Swelling: absent  Effusion: absent  Deformity: absent  Bruising: absent    Range of Motion   Extension: 5   Flexion: 80     Other   Sensation: normal    Comments:  Incision sites healing well, without signs of erythema, infection, or wound dehiscence    10 ° extension lag on  SLR    Vascular Exam     Right Pulses  Dorsalis Pedis:      2+  Posterior Tibial:      2+                    Assessment:       Encounter Diagnosis   Name Primary?    S/P ACL reconstruction Yes          Plan:         1. Stressed the importance of compliance with wearing her T scope knee brace as instructed.  She will continue to utilize hinged T-scope knee brace until we see her again for 6 week post-op appt. Must be locked in extension during ambulation, but can bend the knee from 0-90 degrees at other times. May progress to 50% WBAT tomorrow.     2. Suture tags removed Steri-Strips applied. Patient may now shower without covering incision site. Instructed not to submerge incision site in water for another 2 weeks    3. Continue daily ASA and Celebrex    4. Continue [facility] PT per protocol.     5. RTC to see Orlando Malave MD in 4 weeks for 6 week post-op evaluation      All of the patient's questions were answered. Patient was advised to call the clinic or contact me through the patient portal for any questions or concerns.                     Patient questionnaires may have been collected.

## 2022-08-03 ENCOUNTER — CLINICAL SUPPORT (OUTPATIENT)
Dept: REHABILITATION | Facility: HOSPITAL | Age: 37
End: 2022-08-03
Payer: MEDICAID

## 2022-08-03 DIAGNOSIS — Z98.890 S/P ACL RECONSTRUCTION: Primary | ICD-10-CM

## 2022-08-03 DIAGNOSIS — Z98.890 S/P MEDIAL MENISCUS REPAIR OF RIGHT KNEE: ICD-10-CM

## 2022-08-03 PROCEDURE — 97110 THERAPEUTIC EXERCISES: CPT

## 2022-08-03 NOTE — PROGRESS NOTES
Physical Therapy Daily Note     Name: Flora Madrid  Clinic Number: [id number]  Diagnosis:   Encounter Diagnoses   Name Primary?    S/P ACL reconstruction Yes    S/P medial meniscus repair of right knee      Physician: DELONTE Garcia MD    Precautions: see complete protocol in .    Per op report:   She will be NWB for 2 weeks, followed by 2 weeks followed by 50% weight bearing for 2 weeks followed by weightbearing as tolerated. For the 1st 6 weeks she will be in a T-scope brace. This will be locked in full extension during all mobilization during this time, otherwise she can range from 0-90 degrees. She will follow the ACL reconstruction protocol with meniscus repair. Return to clinic in 2 weeks for postoperative wound check and suture removal.   Visit #: 2 of 24  PTA Visit #: 0  Time In: 0814  Time Out: 0852    Subjective     Pt reports: Patient report some (R) knee pain today. Patient reports she went to 's appt yesterday and he was not happy because she was not wearing her T-scope brace. Patient did ambulate into clinic with axillary crutches NWB on (R) LE. pPain Scale: Flora rates pain on a scale of 0-10 to be 5 prior to treatment and 2 after treatment.     Objective     The patient received the following supervised modalities after being cleared for contradictions: MFAC to (R) quads x15 minutes with 2 second ramp up and down and 10 second on and off with illiciting strong muscle contraction with patient performing quad set with small bolster under ankle to facilitate knee extension stretch.      Patient received ice to (R) knee with (R) knee in extension to impact extension ROM and swelling/pain relief.     Written Home Exercises Provided: provided initial evaluation.     Education provided re: Extensive education on physician provided precautions and protocols as patient continues to not wear (R) knee  brace according to protocols. Reinforced importance of following protocols for proper healing. Patient verbalized understanding. Patient educated that PT unable to progress treatment program per protocols unless she wears knee brace as instructed. Patient educated to not perform HEP if she is not wearing her knee brace to avoid injury.   Flora verbalized understanding of education provided.   No spiritual or educational barriers to learning provided    Assessment     Patient tolerated treatment fair. PT reinforced patient protocol and precautions. Patient verbalized understanding. Decrease in (R) knee pain after treatment. PT treatment limited at this time to follow protocol as patient has come to evaluation and 2 treatment sessions not wearing (R) T-scope brace. Patient at end or appt asking about OTC vitamins/medications to decrease joint swelling and questioning if she can get a tattoo. Patient instructed to direct these questions to medical provider as PT is unable to answer these questions. Patient ambulated out of clinic with axillary crutches NWB on (R) LE. Patient was supposed to progress to 50% weight bearing today; however, due to patient not wearing brace and patient poor compliance with precautions PT instructed patient to remain NWB until able to educate patient at next session what proper 50% weight bearing is during gait and activities.   This is a 36 y.o. female referred to outpatient physical therapy and presents with a medical diagnosis of s/p (R) ACL reconstruction with meniscus repair and demonstrates limitations as described in the problem list. Pt prognosis is Good. Pt will continue to benefit from skilled outpatient physical therapy to address the deficits listed in the problem list, provide pt/family education and to maximize pt's level of independence in the home and community environment.     Goals as follows:  Short Term GOALS: 4 weeks. Pt agrees with goals set.  1. Patient demonstrates  independence with HEP.   2. Patient will follow all precautions per protocol.  3. Patient will perform SLR without extension lag.      Long Term GOALS: 8  weeks. Pt agrees with goals set.  1. Patient will ambulate with least restrictive device with no gait deviations in accordance with protocol.   2. Patient will demonstrate (R) knee ROM WNL.   3. Patient will improve (R) knee strength to 4/5 or greater.  4. Patient will improve LEFS score to 60 or greater.  5. Patient will report (R) knee pain 2/10 or less.      Plan     Continue with established Plan of Care towards PT goals.    Therapist: Evette Hernandez, PT  8/3/2022

## 2022-08-05 DIAGNOSIS — G89.18 POST-OP PAIN: Primary | ICD-10-CM

## 2022-08-09 RX ORDER — TRAMADOL HYDROCHLORIDE 50 MG/1
50 TABLET ORAL EVERY 6 HOURS
Qty: 21 TABLET | Refills: 0 | Status: SHIPPED | OUTPATIENT
Start: 2022-08-09 | End: 2023-04-03 | Stop reason: CLARIF

## 2022-08-09 RX ORDER — METHOCARBAMOL 500 MG/1
500 TABLET, FILM COATED ORAL 3 TIMES DAILY PRN
Qty: 30 TABLET | Refills: 0 | Status: SHIPPED | OUTPATIENT
Start: 2022-08-09 | End: 2022-08-26 | Stop reason: SDUPTHER

## 2022-08-10 ENCOUNTER — CLINICAL SUPPORT (OUTPATIENT)
Dept: REHABILITATION | Facility: HOSPITAL | Age: 37
End: 2022-08-10
Payer: MEDICAID

## 2022-08-10 DIAGNOSIS — Z98.890 S/P MEDIAL MENISCUS REPAIR OF RIGHT KNEE: ICD-10-CM

## 2022-08-10 DIAGNOSIS — Z98.890 S/P ACL RECONSTRUCTION: Primary | ICD-10-CM

## 2022-08-10 PROCEDURE — 97110 THERAPEUTIC EXERCISES: CPT

## 2022-08-10 NOTE — PROGRESS NOTES
Physical Therapy Daily Note     Name: Flora Madrid  Clinic Number: [id number]  Diagnosis:   Encounter Diagnosis   Name Primary?    S/P ACL reconstruction Yes     Physician: DELONTE Garcia MD    Precautions: see complete protocol in .    Per op report:   She will be NWB for 2 weeks, followed by 2 weeks followed by 50% weight bearing for 2 weeks followed by weightbearing as tolerated. For the 1st 6 weeks she will be in a T-scope brace. This will be locked in full extension during all mobilization during this time, otherwise she can range from 0-90 degrees. She will follow the ACL reconstruction protocol with meniscus repair. Return to clinic in 2 weeks for postoperative wound check and suture removal.   Visit #: 3 of 24  PTA Visit #: 0  Time In: 1003  Time Out: 1100    Subjective     Pt reports: Patient report some (R) knee pain today.Patient reports she is performing HEP without difficulty and is noticing that the exercises are becoming easier. Patient ambulating with axillary crutches with T-scope brace 50% weight bearing (R) LE.   Pain Scale: Flora rates pain on a scale of 0-10 to be 5 prior to treatment and 4 after treatment.     Objective     The patient received the following supervised modalities after being cleared for contradictions: MFAC to (R) quads x15 minutes with 2 second ramp up and down and 10 second on and off with illiciting strong muscle contraction with patient performing quad set   Flora received individual therapeutic exercises to develop strength, endurance, ROM, flexibility, posture and core stabilization for 42 minutes including:  3x10 SLR 4 ways AROM, SAQ, AP, supine heel slides to 90 degrees flexion 3x10 using (L) LE for guide to observe precautions.     Written Home Exercises Provided: provided initial evaluation.     Education provided re: Continuing to reinforce precautions and  protocols, education on proper alignment of T-scope brace.   Flora verbalized understanding of education provided.   No spiritual or educational barriers to learning provided    Assessment     Patient tolerated treatment well. Improvement in ability to perform Active exercises. Patient demonstrating appropriate 50% WB (R) LE with axillary crutches.   Will continue to progress treatment in accordance with protocol.   This is a 37 y.o. female referred to outpatient physical therapy and presents with a medical diagnosis of s/p (R) ACL reconstruction with meniscus repair and demonstrates limitations as described in the problem list. Pt prognosis is Good. Pt will continue to benefit from skilled outpatient physical therapy to address the deficits listed in the problem list, provide pt/family education and to maximize pt's level of independence in the home and community environment.     Goals as follows:  Short Term GOALS: 4 weeks. Pt agrees with goals set.  1. Patient demonstrates independence with HEP.   2. Patient will follow all precautions per protocol.  3. Patient will perform SLR without extension lag.      Long Term GOALS: 8  weeks. Pt agrees with goals set.  1. Patient will ambulate with least restrictive device with no gait deviations in accordance with protocol.   2. Patient will demonstrate (R) knee ROM WNL.   3. Patient will improve (R) knee strength to 4/5 or greater.  4. Patient will improve LEFS score to 60 or greater.  5. Patient will report (R) knee pain 2/10 or less.      Plan     Continue with established Plan of Care towards PT goals.    Therapist: Evette Hernandez, PT  8/10/2022

## 2022-08-12 ENCOUNTER — CLINICAL SUPPORT (OUTPATIENT)
Dept: REHABILITATION | Facility: HOSPITAL | Age: 37
End: 2022-08-12
Payer: MEDICAID

## 2022-08-12 DIAGNOSIS — Z98.890 S/P ACL RECONSTRUCTION: Primary | ICD-10-CM

## 2022-08-12 PROCEDURE — 97110 THERAPEUTIC EXERCISES: CPT

## 2022-08-12 NOTE — PROGRESS NOTES
Physical Therapy Daily Note     Name: Flora Madrid  Clinic Number: [id number]  Diagnosis:   No diagnosis found.  Physician: DELONTE Garcia MD    Precautions: see complete protocol in .    Per op report:   She will be NWB for 2 weeks, followed by 2 weeks followed by 50% weight bearing for 2 weeks followed by weightbearing as tolerated. For the 1st 6 weeks she will be in a T-scope brace. This will be locked in full extension during all mobilization during this time, otherwise she can range from 0-90 degrees. She will follow the ACL reconstruction protocol with meniscus repair. Return to clinic in 2 weeks for postoperative wound check and suture removal.   Visit #: 4 of 24  PTA Visit #: 0  Time In: 0816  Time Out: 0859    Subjective     Pt reports: Patient no new complaints today. Patient late for appt stating she overslept. Patient ambulating with axillary crutches with T-scope brace 50% weight bearing (R) LE.   Pain Scale: Flora rates pain on a scale of 0-10 to be 5 prior to treatment and 3 after treatment.     Objective     The patient received the following supervised modalities after being cleared for contradictions: MFAC to (R) quads x15 minutes with 2 second ramp up and down and 10 second on and off with illiciting strong muscle contraction with patient performing quad set   Flora received individual therapeutic exercises to develop strength, endurance, ROM, flexibility, posture and core stabilization for 28 minutes including:  3x10 SLR 4 ways AROM, SAQ, AP, supine heel slides to 90 degrees flexion 3x10 using (L) LE for guide to observe precautions.     Written Home Exercises Provided: provided initial evaluation.     Education provided re: Continuing to reinforce precautions and protocols, education on proper alignment of T-scope brace.   Flora verbalized understanding of education provided.   No spiritual  or educational barriers to learning provided    Assessment     Patient tolerated treatment well. Notice improvement in (R) LE mobility and function. Will continue to progress per protocol.     This is a 37 y.o. female referred to outpatient physical therapy and presents with a medical diagnosis of s/p (R) ACL reconstruction with meniscus repair and demonstrates limitations as described in the problem list. Pt prognosis is Good. Pt will continue to benefit from skilled outpatient physical therapy to address the deficits listed in the problem list, provide pt/family education and to maximize pt's level of independence in the home and community environment.     Goals as follows:  Short Term GOALS: 4 weeks. Pt agrees with goals set.  1. Patient demonstrates independence with HEP.   2. Patient will follow all precautions per protocol.  3. Patient will perform SLR without extension lag.      Long Term GOALS: 8  weeks. Pt agrees with goals set.  1. Patient will ambulate with least restrictive device with no gait deviations in accordance with protocol.   2. Patient will demonstrate (R) knee ROM WNL.   3. Patient will improve (R) knee strength to 4/5 or greater.  4. Patient will improve LEFS score to 60 or greater.  5. Patient will report (R) knee pain 2/10 or less.      Plan     Continue with established Plan of Care towards PT goals.    Therapist: Evette Hernandez, PT  8/12/2022

## 2022-08-15 ENCOUNTER — CLINICAL SUPPORT (OUTPATIENT)
Dept: REHABILITATION | Facility: HOSPITAL | Age: 37
End: 2022-08-15
Payer: MEDICAID

## 2022-08-15 DIAGNOSIS — Z98.890 S/P ACL RECONSTRUCTION: ICD-10-CM

## 2022-08-15 DIAGNOSIS — Z98.890 S/P MEDIAL MENISCUS REPAIR OF RIGHT KNEE: Primary | ICD-10-CM

## 2022-08-15 PROCEDURE — 97110 THERAPEUTIC EXERCISES: CPT

## 2022-08-15 NOTE — PROGRESS NOTES
Physical Therapy Daily Note     Name: Flora Madrid  Clinic Number: [id number]  Diagnosis:   Encounter Diagnoses   Name Primary?    S/P medial meniscus repair of right knee Yes    S/P ACL reconstruction      Physician: DELONTE Garcia MD    Precautions: see complete protocol in .    Per op report:   She will be NWB for 2 weeks, followed by 2 weeks followed by 50% weight bearing for 2 weeks followed by weightbearing as tolerated. For the 1st 6 weeks she will be in a T-scope brace. This will be locked in full extension during all mobilization during this time, otherwise she can range from 0-90 degrees. She will follow the ACL reconstruction protocol with meniscus repair. Return to clinic in 2 weeks for postoperative wound check and suture removal.   Visit #: 5 of 24  PTA Visit #: 0  Time In: 0803  Time Out: 0903    Subjective     Pt reports: Patient no new complaints today. Patient reports compliance with exercises. Patient reports compliance with wearing of knee brace and WB precautions.   Pain Scale: Flora rates pain on a scale of 0-10 to be 5 prior to treatment.    Objective   Re-assessment performed today.   The patient received the following supervised modalities after being cleared for contradictions: MFAC to (R) quads x15 minutes with 2 second ramp up and down and 10 second on and off with illiciting strong muscle contraction with patient performing quad set   Flora received individual therapeutic exercises to develop strength, endurance, ROM, flexibility, posture and core stabilization for 45 minutes including:  3x10 SLR 4 ways AROM, SAQ, AP, supine heel slides to 90 degrees flexion 3x10 using (L) LE for guide to observe precautions, mini squats with brace open to 90 degrees 3x10 with verbal cues for maintaining 50% weight bearing (R) LE.     Range of Motion: Knee    Left Right   Flexion: 140 90    Extension 0 -5            ROM:  Ankle Left Right   Dorsiflexion WNL  (-5) degrees   Plantarflexion WNL  (48) degrees   Inversion WNL  (25) degrees   Eversion WNL  (20) degrees            Lower Extremity Strength  Right LE   Left LE     Knee extension: 3-/5 Knee extension: 5/5   Knee flexion: 3-/5 Knee flexion: 5/5   Hip flexion: 3/5 Hip flexion: 5/5   Hip extension:  3/5 Hip extension: 5/5   Hip abduction: 3/5 Hip abduction: 5/5   Hip adduction: 3/5 Hip adduction 5/5   Ankle dorsiflexion: 3-/5 Ankle dorsiflexion: 5/5   Ankle plantarflexion: 3/5 Ankle plantarflexion: 5/5     Written Home Exercises Provided: provided initial evaluation.     Education provided re: Continuing to reinforce precautions and protocols, education on proper alignment of T-scope brace.   Flora verbalized understanding of education provided.   No spiritual or educational barriers to learning provided    LOWER EXTREMITY FUNCTIONAL SCALE  25/80         1. Any of your usual work, housework or school activities   1/4  2. Your usual hobbies, sporting     2/4  3. Getting in and out of tub      2/4  4. Walking between rooms      1/4  5. Putting on shoes or socks      0/4  6. Squatting        0/4  7. Lifting an object from the ground      2/4  8. Performing light activities around the home   3/4  9. Performing heavy activities around the home   2/4  10. Getting in and out of car      3/4  11. Walking 2 blocks       1/4  12.Walking a mile       0/4  13. Getting up and down 1 flight of stairs    2/4  14. Standing for 1 hour      2/4  15. Sitting for an hour       0/4  16. Running on even ground      0/4  17. Running on uneven ground     0/4  18. Making sharp turns when running fast    0/4  19. Hopping        0/4  20. Rolling over in bed      1/4      Assessment     Patient tolerated treatment well. Notice improvement in (R) LE mobility and function. Will continue to progress per protocol. Patient donning/doffing (R) T-scope brace. Patient with  improvement in LEFS score. Patient with improvement in (R) LE strength grossly assessed against gravity. Patient (R) knee extension improvement unable to flex (R) knee past 90 due to protocols and improvement in (R) ankle DF.      This is a 37 y.o. female referred to outpatient physical therapy and presents with a medical diagnosis of s/p (R) ACL reconstruction with meniscus repair and demonstrates limitations as described in the problem list. Pt prognosis is Good. Pt will continue to benefit from skilled outpatient physical therapy to address the deficits listed in the problem list, provide pt/family education and to maximize pt's level of independence in the home and community environment.     Goals as follows:  Short Term GOALS: 4 weeks. Pt agrees with goals set.  1. Patient demonstrates independence with HEP. Met CB 8/15/2022  2. Patient will follow all precautions per protocol.. Progress CB 8/15/2022  3. Patient will perform SLR without extension lag. Progress CB 8/15/2022       Long Term GOALS: 8  weeks. Pt agrees with goals set.  1. Patient will ambulate with least restrictive device with no gait deviations in accordance with protocol. Progress CB 8/15/2022    2. Patient will demonstrate (R) knee ROM WNL. Progress CB 8/15/2022    3. Patient will improve (R) knee strength to 4/5 or greater.Progress CB 8/15/2022    4. Patient will improve LEFS score to 60 or greater.Progress CB 8/15/2022    5. Patient will report (R) knee pain 2/10 or less. Progress CB 8/15/2022       Plan     Continue with established Plan of Care towards PT goals.    Therapist: Evette Hernandez, PT  8/15/2022

## 2022-08-17 ENCOUNTER — CLINICAL SUPPORT (OUTPATIENT)
Dept: REHABILITATION | Facility: HOSPITAL | Age: 37
End: 2022-08-17
Payer: MEDICAID

## 2022-08-17 DIAGNOSIS — Z98.890 S/P ACL RECONSTRUCTION: Primary | ICD-10-CM

## 2022-08-17 PROCEDURE — 97110 THERAPEUTIC EXERCISES: CPT | Mod: CQ

## 2022-08-17 NOTE — PROGRESS NOTES
Physical Therapy Daily Note     Name: Flora Madrid  Clinic Number: [id number]  Diagnosis: S/P ACL reconstruction R on 7/20/22   Physician: DELONTE Garcia MD    Precautions: see complete protocol in .   Per op report:   She will be NWB for 2 weeks, followed by 2 weeks followed by 50% weight bearing for 2 weeks followed by weightbearing as tolerated. For the 1st 6 weeks she will be in a T-scope brace. This will be locked in full extension during all mobilization during this time, otherwise she can range from 0-90 degrees. She will follow the ACL reconstruction protocol with meniscus repair. Return to clinic in 2 weeks for postoperative wound check and suture removal.   Visit #: 6 of 24  PTA Visit #: 1  Time In: 0816  Time Out: 0920  RTMD: 8/30/22    Subjective     Pt reports: estim helps to start off treatment.   Pain Scale: Flora rates pain on a scale of 0-10 to be 5 prior to treatment.    Objective     GT instructing pt on sequencing and technique w/ use of 1 crutch.     The patient received the following supervised modalities after being cleared for contradictions: MFAC to (R) quads x 15 minutes with 2 second ramp up and down and 10 second on and off with illiciting strong muscle contraction with patient performing quad set.      Flora received individual therapeutic exercises to develop strength, endurance, ROM, flexibility, posture and core stabilization for 49 minutes including:  3x10 SLR 4 ways AROM 0.5#  SAQ 1.5#  supine heel slides to 90 degrees flexion 3x10 using (L) LE for guide to observe precautions-deferred  Mini squats with brace open to 90 degrees 3x10 with verbal cues for maintaining WBAT (R) LE & midline position  Standing heel raises cueing to avoid momentum 3x10    Measurements per Evette Hernandez, PT 8/15/22  Range of Motion: Knee    Left Right   Flexion: 140 90   Extension 0 -5    ROM:  Ankle  Left Right   Dorsiflexion WNL  (-5) degrees   Plantarflexion WNL  (48) degrees   Inversion WNL  (25) degrees   Eversion WNL  (20) degrees        Written Home Exercises Provided: provided initial evaluation.     Education provided re: stair training & GT w/ 1 crutch   Flora verbalized understanding of education provided.   No spiritual or educational barriers to learning provided      Assessment     Pt demonstrated understanding crutch training after education.     This is a 37 y.o. female referred to outpatient physical therapy and presents with a medical diagnosis of s/p (R) ACL reconstruction with meniscus repair and demonstrates limitations as described in the problem list. Pt prognosis is Good. Pt will continue to benefit from skilled outpatient physical therapy to address the deficits listed in the problem list, provide pt/family education and maximize pt's level of independence in the home and community environment.     Goals as follows:  Short Term GOALS: 4 weeks. Pt agrees with goals set.  1. Patient demonstrates independence with HEP. Met CB 8/15/2022  2. Patient will follow all precautions per protocol.. Progress CB 8/15/2022  3. Patient will perform SLR without extension lag. Progress CB 8/15/2022       Long Term GOALS: 8  weeks. Pt agrees with goals set.  1. Patient will ambulate with least restrictive device with no gait deviations in accordance with protocol. Progress CB 8/15/2022    2. Patient will demonstrate (R) knee ROM WNL. Progress CB 8/15/2022    3. Patient will improve (R) knee strength to 4/5 or greater.Progress CB 8/15/2022    4. Patient will improve LEFS score to 60 or greater.Progress CB 8/15/2022    5. Patient will report (R) knee pain 2/10 or less. Progress CB 8/15/2022       Plan     Continue with established Plan of Care towards PT goals.    Therapist: Pina Ramirez, PTA  8/17/2022

## 2022-08-19 ENCOUNTER — CLINICAL SUPPORT (OUTPATIENT)
Dept: REHABILITATION | Facility: HOSPITAL | Age: 37
End: 2022-08-19
Payer: MEDICAID

## 2022-08-19 DIAGNOSIS — Z98.890 S/P MEDIAL MENISCUS REPAIR OF RIGHT KNEE: Primary | ICD-10-CM

## 2022-08-19 DIAGNOSIS — Z98.890 S/P ACL RECONSTRUCTION: ICD-10-CM

## 2022-08-19 PROCEDURE — 97110 THERAPEUTIC EXERCISES: CPT

## 2022-08-24 ENCOUNTER — DOCUMENTATION ONLY (OUTPATIENT)
Dept: REHABILITATION | Facility: HOSPITAL | Age: 37
End: 2022-08-24
Payer: MEDICAID

## 2022-08-24 NOTE — PROGRESS NOTES
Physical Therapy      Patient Name:  Flora Madrid   MRN:  [id number]    Patient not seen at this time due to N/S. Will follow-up at next scheduled appt.    Pina Ramirez, NNAMDI  8/24/2022

## 2022-08-26 ENCOUNTER — PATIENT MESSAGE (OUTPATIENT)
Dept: SPORTS MEDICINE | Facility: CLINIC | Age: 37
End: 2022-08-26
Payer: MEDICAID

## 2022-08-26 DIAGNOSIS — Z98.890 S/P ACL RECONSTRUCTION: Primary | ICD-10-CM

## 2022-08-29 ENCOUNTER — PATIENT MESSAGE (OUTPATIENT)
Dept: SPORTS MEDICINE | Facility: CLINIC | Age: 37
End: 2022-08-29
Payer: MEDICAID

## 2022-09-06 ENCOUNTER — OFFICE VISIT (OUTPATIENT)
Dept: SPORTS MEDICINE | Facility: CLINIC | Age: 37
End: 2022-09-06
Payer: MEDICAID

## 2022-09-06 VITALS
SYSTOLIC BLOOD PRESSURE: 126 MMHG | HEART RATE: 101 BPM | WEIGHT: 116.38 LBS | DIASTOLIC BLOOD PRESSURE: 85 MMHG | HEIGHT: 65 IN | BODY MASS INDEX: 19.39 KG/M2

## 2022-09-06 DIAGNOSIS — S83.241A ACUTE MEDIAL MENISCAL TEAR, RIGHT, INITIAL ENCOUNTER: ICD-10-CM

## 2022-09-06 DIAGNOSIS — Z98.890 S/P ACL RECONSTRUCTION: Primary | ICD-10-CM

## 2022-09-06 DIAGNOSIS — S83.511D RUPTURE OF ANTERIOR CRUCIATE LIGAMENT OF RIGHT KNEE, SUBSEQUENT ENCOUNTER: ICD-10-CM

## 2022-09-06 PROCEDURE — 3074F SYST BP LT 130 MM HG: CPT | Mod: CPTII,,, | Performed by: STUDENT IN AN ORGANIZED HEALTH CARE EDUCATION/TRAINING PROGRAM

## 2022-09-06 PROCEDURE — 99999 PR PBB SHADOW E&M-EST. PATIENT-LVL III: ICD-10-PCS | Mod: PBBFAC,,, | Performed by: STUDENT IN AN ORGANIZED HEALTH CARE EDUCATION/TRAINING PROGRAM

## 2022-09-06 PROCEDURE — 99024 POSTOP FOLLOW-UP VISIT: CPT | Mod: ,,, | Performed by: STUDENT IN AN ORGANIZED HEALTH CARE EDUCATION/TRAINING PROGRAM

## 2022-09-06 PROCEDURE — 1160F RVW MEDS BY RX/DR IN RCRD: CPT | Mod: CPTII,,, | Performed by: STUDENT IN AN ORGANIZED HEALTH CARE EDUCATION/TRAINING PROGRAM

## 2022-09-06 PROCEDURE — 1160F PR REVIEW ALL MEDS BY PRESCRIBER/CLIN PHARMACIST DOCUMENTED: ICD-10-PCS | Mod: CPTII,,, | Performed by: STUDENT IN AN ORGANIZED HEALTH CARE EDUCATION/TRAINING PROGRAM

## 2022-09-06 PROCEDURE — 3079F DIAST BP 80-89 MM HG: CPT | Mod: CPTII,,, | Performed by: STUDENT IN AN ORGANIZED HEALTH CARE EDUCATION/TRAINING PROGRAM

## 2022-09-06 PROCEDURE — 3008F PR BODY MASS INDEX (BMI) DOCUMENTED: ICD-10-PCS | Mod: CPTII,,, | Performed by: STUDENT IN AN ORGANIZED HEALTH CARE EDUCATION/TRAINING PROGRAM

## 2022-09-06 PROCEDURE — 99024 PR POST-OP FOLLOW-UP VISIT: ICD-10-PCS | Mod: ,,, | Performed by: STUDENT IN AN ORGANIZED HEALTH CARE EDUCATION/TRAINING PROGRAM

## 2022-09-06 PROCEDURE — 3079F PR MOST RECENT DIASTOLIC BLOOD PRESSURE 80-89 MM HG: ICD-10-PCS | Mod: CPTII,,, | Performed by: STUDENT IN AN ORGANIZED HEALTH CARE EDUCATION/TRAINING PROGRAM

## 2022-09-06 PROCEDURE — 1159F PR MEDICATION LIST DOCUMENTED IN MEDICAL RECORD: ICD-10-PCS | Mod: CPTII,,, | Performed by: STUDENT IN AN ORGANIZED HEALTH CARE EDUCATION/TRAINING PROGRAM

## 2022-09-06 PROCEDURE — 1159F MED LIST DOCD IN RCRD: CPT | Mod: CPTII,,, | Performed by: STUDENT IN AN ORGANIZED HEALTH CARE EDUCATION/TRAINING PROGRAM

## 2022-09-06 PROCEDURE — 99999 PR PBB SHADOW E&M-EST. PATIENT-LVL III: CPT | Mod: PBBFAC,,, | Performed by: STUDENT IN AN ORGANIZED HEALTH CARE EDUCATION/TRAINING PROGRAM

## 2022-09-06 PROCEDURE — 3008F BODY MASS INDEX DOCD: CPT | Mod: CPTII,,, | Performed by: STUDENT IN AN ORGANIZED HEALTH CARE EDUCATION/TRAINING PROGRAM

## 2022-09-06 PROCEDURE — 3074F PR MOST RECENT SYSTOLIC BLOOD PRESSURE < 130 MM HG: ICD-10-PCS | Mod: CPTII,,, | Performed by: STUDENT IN AN ORGANIZED HEALTH CARE EDUCATION/TRAINING PROGRAM

## 2022-09-06 PROCEDURE — 99213 OFFICE O/P EST LOW 20 MIN: CPT | Mod: PBBFAC,PN | Performed by: STUDENT IN AN ORGANIZED HEALTH CARE EDUCATION/TRAINING PROGRAM

## 2022-09-06 NOTE — PROGRESS NOTES
Subjective:          Chief Complaint: Flora Madrid is a 37 y.o. female who had concerns including Post-op Evaluation of the Right Knee.    HPI     Patient is here s/p Right ACL recon for 6 week post-op appointment. She reports 2/10 pain. She has missed about 1 week of physical therapy due to family responsibilities.  She denies any nausea, vomiting, fever, chills, shortness of breath, or calf pain. She is attending formal physical therapy at [facility] in [place] 3x week.  She is currently not using crutches and her T-scope knee brace is in place.    PROCEDURE PERFORMED by Orlando Malave MD on 07/20/2022:   Right ACL reconstruction with quadriceps tendon autograft  Right medial meniscus repair      Review of Systems   Constitutional: Negative.   HENT: Negative.     Eyes: Negative.    Cardiovascular: Negative.    Respiratory: Negative.     Endocrine: Negative.    Hematologic/Lymphatic: Negative.    Skin: Negative.    Musculoskeletal:  Positive for muscle weakness. Negative for falls, joint pain, joint swelling, myalgias and stiffness.   Neurological: Negative.    Psychiatric/Behavioral: Negative.     Allergic/Immunologic: Negative.                  Objective:        General: Flora is well-developed, well-nourished, appears stated age, in no acute distress, alert and oriented to time, place and person.     General    Nursing note and vitals reviewed.  Constitutional: She is oriented to person, place, and time. She appears well-developed and well-nourished. No distress.   HENT:   Head: Normocephalic and atraumatic.   Nose: Nose normal.   Eyes: EOM are normal.   Cardiovascular:  Intact distal pulses.            Pulmonary/Chest: Effort normal. No respiratory distress.   Neurological: She is alert and oriented to person, place, and time.   Psychiatric: She has a normal mood and affect. Her behavior is normal. Judgment and thought content normal.           Right Knee Exam     Inspection   Erythema:  absent  Scars: present  Swelling: absent  Effusion: absent  Deformity: absent  Bruising: absent    Range of Motion   Extension:  0   Flexion:  130     Tests   Ligament Examination   Lachman: normal (-1 to 2mm)   PCL-Posterior Drawer: normal (0 to 2mm)     MCL - Valgus: normal (0 to 2mm)  LCL - Varus: normal    Other   Sensation: normal    Comments:  Incision sites healing well, without signs of erythema, infection, or wound dehiscence    Left Knee Exam     Range of Motion   Extension:  -5   Flexion:  150     Tests   Stability   Lachman: normal (-1 to 2mm)     Muscle Strength   Right Lower Extremity   Quadriceps:  4/5   Hamstrin/5     Vascular Exam     Right Pulses  Dorsalis Pedis:      2+  Posterior Tibial:      2+      Imaging:   X-rays of the right knee from 2022 personally reviewed by me on that day.  These include nonweightbearing AP and lateral.  Evidence of prior ACL reconstruction with suspensory fixation on the femur and tibia.  No noted complications.          Assessment:     Flora Madrid is a 37 y.o. female 6 weeks status post above and doing well.  Encounter Diagnoses   Name Primary?    S/P ACL reconstruction Yes    Rupture of anterior cruciate ligament of right knee, subsequent encounter     Acute medial meniscal tear, right, initial encounter             Plan:       She is doing well.  We will send a new referral physical therapy for her to continue.  She will continue per protocol and advance as tolerated.  I will have her return to clinic in 6 weeks for repeat evaluation.  She can ambulate with the hinged brace unlocked.  Brace can be discontinued when she can ambulate with essentially no limp.      All of the patient's questions were answered. Patient was advised to call the clinic or contact me through the patient portal for any questions or concerns.                     Patient questionnaires may have been collected.

## 2022-09-12 ENCOUNTER — CLINICAL SUPPORT (OUTPATIENT)
Dept: REHABILITATION | Facility: HOSPITAL | Age: 37
End: 2022-09-12
Payer: MEDICAID

## 2022-09-12 DIAGNOSIS — Z98.890 S/P MEDIAL MENISCUS REPAIR OF RIGHT KNEE: Primary | ICD-10-CM

## 2022-09-12 DIAGNOSIS — Z98.890 S/P ACL RECONSTRUCTION: ICD-10-CM

## 2022-09-12 PROCEDURE — 97110 THERAPEUTIC EXERCISES: CPT

## 2022-09-12 NOTE — PROGRESS NOTES
Physical Therapy Daily Note                                                           PT Re-assessment     Name: Flora Madrid  Clinic Number: [id number]  Diagnosis: S/P ACL reconstruction R on 7/20/22 ; s/p medical mensicus repair of (R) knee  Physician: DELONTE Garcia MD    Precautions: see complete protocol in .   Orders: New orders received from Dr. Malave on 9/6/2022 to Continue PT per protocol.   Visit #: 8 of 24  PTA Visit #: 0  Time In: 0900  Time Out: 1025  Re-assessment performed 9/12/2022  Subjective     Pt reports: Patient reports minimal complaints of (R) knee pain. Patient reports she thought her therapy was finished after she did not show up to last appt. Patient reports she has been intermittently compliant with HEP. Patient reports she stopped wearing her (R) T-scope brace and is ambulating regularly without crutches.    Pain Scale: Flora rates pain on a scale of 0-10 to be 4 prior to treatment. Patient reports decreased (R) knee pain to 3/10 following treatment.     Objective   Patient ambulated into clinic without using AD and without T-scope brace on with no noted gait deviations.     The patient received the following supervised modalities after being cleared for contradictions:   MFAC to (R) quads x 15 minutes with 2 second ramp up and down and 10 second on and off with illiciting strong muscle contraction with patient performing quad set.    CP to (R) knee x10 minutes with (R) knee in extension.     Flora received individual therapeutic exercises to develop strength, endurance, ROM, flexibility, posture and core stabilization for 60 minutes including:  Exercises in BOLD performed today.   3x10 SLR 4 ways AROM 0.5#  SAQ 1.5#-deferred today  supine heel slides to 90 degrees flexion 3x10 using (L) LE for guide to observe precautions-deferred today  Mini squats 3x10 with verbal cues for  proper technique  Standing heel raises cueing to avoid momentum 3x10 on blue foam discs  Standing HS curls (B) 3x10 on blue foam discs  LE bike x15 minutes with smooth revolutions but slow pace.   LAQ 3x10 with 1.5# ankle weight.       Range of Motion: Knee    Left Right   Flexion: 140 129   Extension 0 2 from full extension      ROM:  Ankle Left Right   Dorsiflexion WNL  WNL   Plantarflexion WNL  WNL   Inversion WNL  WNL   Eversion WNL  WNL      Lower Extremity Strength  Right LE   Left LE     Knee extension: 4-/5 Knee extension: 5/5   Knee flexion: 4/5 Knee flexion: 5/5   Hip flexion: 4-/5 Hip flexion: 5/5   Hip extension:  4-/5 Hip extension: 5/5   Hip abduction: 4-/5 Hip abduction: 5/5   Hip adduction: 4-/5 Hip adduction 5/5   Ankle dorsiflexion: 4-/5 Ankle dorsiflexion: 5/5   Ankle plantarflexion: 4-/5 Ankle plantarflexion: 5/5     Edema:  Left: absent  Right: minimal     Girth    Left Right   10 cm above joint line 38 cm 36 cm   Joint Line 33 cm 34 cm   10 cm below joint line 32 cm 32 cm       Written Home Exercises Provided: provided initial evaluation.     Education provided re: using one axillary crutch to ambulate.  Flora verbalized understanding of education provided.   No spiritual or educational barriers to learning provided    LOWER EXTREMITY FUNCTIONAL SCALE  32/80         1. Any of your usual work, housework or school activities   2/4  2. Your usual hobbies, sporting     1/4  3. Getting in and out of tub      2/4  4. Walking between rooms      3/4  5. Putting on shoes or socks      2/4  6. Squatting        2/4  7. Lifting an object from the ground      4/4  8. Performing light activities around the home   2/4  9. Performing heavy activities around the home   2/4  10. Getting in and out of car      2/4  11. Walking 2 blocks       1/4  12.Walking a mile       1/4  13. Getting up and down 1 flight of stairs    2/4  14. Standing for 1 hour      2/4  15. Sitting for an hour       1/4  16. Running on even  ground      1/4  17. Running on uneven ground     0/4  18. Making sharp turns when running fast    0/4  19. Hopping        0/4  20. Rolling over in bed      2/4   Assessment     Patient seen today for re-assessment visit and treatment to re-establish POC due to orders received from Dr. Malave to continue with PT per protocol. Long discussion with patient on importance of attendance of therapy appts and compliance with HEP as well as safety with daily activities due to (R) knee continuing to go through the healing process. Patient and therapist discussed decreasing visits to 2 days per week for more manageable attendance. Patient has made improvements in (R) knee ROM and strength but continues to have functional deficits and show quad weakness. Patient with improvement in LEFS score and decreased girth measurements (R) knee.     This is a 37 y.o. female referred to outpatient physical therapy and presents with a medical diagnosis of s/p (R) ACL reconstruction with meniscus repair and demonstrates limitations as described in the problem list. Pt prognosis is Good. Pt will continue to benefit from skilled outpatient physical therapy to address the deficits listed in the problem list, provide pt/family education and maximize pt's level of independence in the home and community environment.     Goals as follows:  Short Term GOALS: 4 weeks. Pt agrees with goals set.  1. Patient demonstrates independence with HEP. Met CB 8/15/2022  2. Patient will follow all precautions per protocol.. Met CB 9/12/2022  Progress CB 8/15/2022  3. Patient will perform SLR without extension lag. Met CB 9/12/2022 Progress CB 8/15/2022       Long Term GOALS: 8  weeks. Pt agrees with goals set.  1. Patient will ambulate with least restrictive device with no gait deviations in accordance with protocol. Met CB 9/12/2022 Progress CB 8/15/2022    2. Patient will demonstrate (R) knee ROM WNL. Met CB 9/12/2022 Progress CB 8/15/2022    3. Patient will  improve (R) knee strength to 4/5 or greater. Progress CB 9/12/2022 Progress CB 8/15/2022    4. Patient will improve LEFS score to 60 or greater.Progress CB 9/12/2022 Progress CB 8/15/2022    5. Patient will report (R) knee pain 2/10 or less. Progress CB 9/12/2022  Progress CB 8/15/2022       Plan     Outpatient physical therapy 2 times weekly to include: pt ed, hep, therapeutic exercises, neuromuscular re-education/ balance exercises, manual therapy and modalities prn including MHP, CP, electrical stimulation, ultrasound.  Cont PT for  6 weeks. Pt may be seen by PTA as part of the rehabilitation team.   Certification dates: 9/12/2022-10/21/2022    Therapist: Evette Hernandez, PT  9/12/2022

## 2022-09-14 ENCOUNTER — CLINICAL SUPPORT (OUTPATIENT)
Dept: REHABILITATION | Facility: HOSPITAL | Age: 37
End: 2022-09-14
Payer: MEDICAID

## 2022-09-14 DIAGNOSIS — Z98.890 S/P MEDIAL MENISCUS REPAIR OF RIGHT KNEE: Primary | ICD-10-CM

## 2022-09-14 DIAGNOSIS — Z98.890 S/P ACL RECONSTRUCTION: ICD-10-CM

## 2022-09-14 PROCEDURE — 97110 THERAPEUTIC EXERCISES: CPT

## 2022-09-14 NOTE — PROGRESS NOTES
Physical Therapy Daily Note                                                               Name: Flora Madrid  Clinic Number: [id number]  Diagnosis: S/P ACL reconstruction R on 7/20/22 ; s/p medical mensicus repair of (R) knee  Physician: DELONTE Garcia MD    Precautions: see complete protocol in .   Orders: New orders received from Dr. Malave on 9/6/2022 to Continue PT per protocol.   Visit #: 9 of 36  PTA Visit #: 0  Time In: 0815  Time Out: 0919  Re-assessment performed 9/12/2022  Subjective     Pt reports: Patient reports minimal complaints of (R) knee pain.   Pain Scale: Flora rates pain on a scale of 0-10 to be 5 prior to treatment. Patient reports decreased (R) knee pain to 3/10 following treatment.     Objective   Patient ambulated into clinic without using AD and without T-scope brace on with no noted gait deviations.     The patient received the following supervised modalities after being cleared for contradictions:   MFAC to (R) quads x 15 minutes with 2 second ramp up and down and 10 second on and off with illiciting strong muscle contraction with patient performing quad set.    CP to (R) knee x10 minutes with (R) knee in extension following exercises for pain relief.     Flora received individual therapeutic exercises to develop strength, endurance, ROM, flexibility, posture and core stabilization for 60 minutes including:  Exercises in BOLD performed today.   3x10 SLR 4 ways AROM 1.0# ankle weight  SAQ 1.5#-deferred today  supine heel slides to 90 degrees flexion 3x10 using (L) LE for guide to observe precautions-deferred today  Mini squats on blue foam 3x10 with verbal cues for proper technique  Standing heel raises cueing to avoid momentum 3x10 on blue foam discs  Standing HS curls (B) 3x10 on blue foam discs  LE bike x15 minutes with smooth revolutions but slow pace.   LAQ 3x10 with 1.0# ankle  weight.     Assessment     PT will continue to progress per POC as appropriate. Patient tolerating addition of exercise progression well. Patient increasing activity level at home and may be contributing to discomfort. Patient educated on safety with functional mobility and protocols.     This is a 37 y.o. female referred to outpatient physical therapy and presents with a medical diagnosis of s/p (R) ACL reconstruction with meniscus repair and demonstrates limitations as described in the problem list. Pt prognosis is Good. Pt will continue to benefit from skilled outpatient physical therapy to address the deficits listed in the problem list, provide pt/family education and maximize pt's level of independence in the home and community environment.     Goals as follows:  Short Term GOALS: 4 weeks. Pt agrees with goals set.  1. Patient demonstrates independence with HEP. Met CB 8/15/2022  2. Patient will follow all precautions per protocol.. Met CB 9/12/2022  Progress CB 8/15/2022  3. Patient will perform SLR without extension lag. Met CB 9/12/2022 Progress CB 8/15/2022       Long Term GOALS: 8  weeks. Pt agrees with goals set.  1. Patient will ambulate with least restrictive device with no gait deviations in accordance with protocol. Met CB 9/12/2022 Progress CB 8/15/2022    2. Patient will demonstrate (R) knee ROM WNL. Met CB 9/12/2022 Progress CB 8/15/2022    3. Patient will improve (R) knee strength to 4/5 or greater. Progress CB 9/12/2022 Progress CB 8/15/2022    4. Patient will improve LEFS score to 60 or greater.Progress CB 9/12/2022 Progress CB 8/15/2022    5. Patient will report (R) knee pain 2/10 or less. Progress CB 9/12/2022  Progress CB 8/15/2022       Plan   Continue with current POC.   Outpatient physical therapy 2 times weekly to include: pt ed, hep, therapeutic exercises, neuromuscular re-education/ balance exercises, manual therapy and modalities prn including MHP, CP, electrical stimulation,  ultrasound.  Cont PT for  6 weeks. Pt may be seen by PTA as part of the rehabilitation team.   Certification dates: 9/12/2022-10/21/2022    Therapist: Evette Hernandez, PT  9/14/2022

## 2022-09-21 ENCOUNTER — DOCUMENTATION ONLY (OUTPATIENT)
Dept: REHABILITATION | Facility: HOSPITAL | Age: 37
End: 2022-09-21
Payer: MEDICAID

## 2022-09-23 ENCOUNTER — DOCUMENTATION ONLY (OUTPATIENT)
Dept: REHABILITATION | Facility: HOSPITAL | Age: 37
End: 2022-09-23
Payer: MEDICAID

## 2022-09-23 NOTE — PROGRESS NOTES
Patient no call, no show for appt. Called patient and she stated she is currently out of town. Patient reminded to call therapy office when unable to attend appts. Patient reminded of next schedule appt. 9/28/2022.

## 2022-09-30 ENCOUNTER — DOCUMENTATION ONLY (OUTPATIENT)
Dept: REHABILITATION | Facility: HOSPITAL | Age: 37
End: 2022-09-30
Payer: MEDICAID

## 2022-09-30 NOTE — PROGRESS NOTES
Physical Therapy      Patient Name:  Flora Madrid   MRN:  [id number]    Patient not seen at this time due to N/S. Will follow-up as needed.    Pina Ramirez, NNAMDI  9/30/2022

## 2022-10-07 ENCOUNTER — DOCUMENTATION ONLY (OUTPATIENT)
Dept: REHABILITATION | Facility: HOSPITAL | Age: 37
End: 2022-10-07
Payer: MEDICAID

## 2022-10-07 NOTE — PROGRESS NOTES
PHYSICAL THERAPY DISCHARGE:    This patient was originally evaluated at our facility on: 7/28/2022         Pt attended PT for a total of 9 Visits receiving: Manual Therapy, Therex, Postural Education, Body Mechanics Training, Home Exercise Instruction     This patient's last visit occurred on: 9/14/2022                                                        Physical Therapy Daily Note                                                                Name: Flora Madrid  Clinic Number: [id number]  Diagnosis: S/P ACL reconstruction R on 7/20/22 ; s/p medical mensicus repair of (R) knee  Physician: DELONTE Garcia MD     Precautions: see complete protocol in .   Orders: New orders received from Dr. Malave on 9/6/2022 to Continue PT per protocol.   Visit #: 9 of 36  PTA Visit #: 0  Time In: 0815  Time Out: 0919  Re-assessment performed 9/12/2022  Subjective      Pt reports: Patient reports minimal complaints of (R) knee pain.   Pain Scale: Flora rates pain on a scale of 0-10 to be 5 prior to treatment. Patient reports decreased (R) knee pain to 3/10 following treatment.      Objective   Patient ambulated into clinic without using AD and without T-scope brace on with no noted gait deviations.      The patient received the following supervised modalities after being cleared for contradictions:   MFAC to (R) quads x 15 minutes with 2 second ramp up and down and 10 second on and off with illiciting strong muscle contraction with patient performing quad set.    CP to (R) knee x10 minutes with (R) knee in extension following exercises for pain relief.      Flora received individual therapeutic exercises to develop strength, endurance, ROM, flexibility, posture and core stabilization for 60 minutes including:  Exercises in BOLD performed today.   3x10 SLR 4 ways AROM 1.0# ankle weight  SAQ 1.5#-deferred today  supine heel slides to 90 degrees flexion 3x10 using (L)  BENJIE for guide to observe precautions-deferred today  Mini squats on blue foam 3x10 with verbal cues for proper technique  Standing heel raises cueing to avoid momentum 3x10 on blue foam discs  Standing HS curls (B) 3x10 on blue foam discs  LE bike x15 minutes with smooth revolutions but slow pace.   LAQ 3x10 with 1.0# ankle weight.      Assessment      PT will continue to progress per POC as appropriate. Patient tolerating addition of exercise progression well. Patient increasing activity level at home and may be contributing to discomfort. Patient educated on safety with functional mobility and protocols.      This is a 37 y.o. female referred to outpatient physical therapy and presents with a medical diagnosis of s/p (R) ACL reconstruction with meniscus repair and demonstrates limitations as described in the problem list. Pt prognosis is Good. Pt will continue to benefit from skilled outpatient physical therapy to address the deficits listed in the problem list, provide pt/family education and maximize pt's level of independence in the home and community environment.      Goals as follows:  Short Term GOALS: 4 weeks. Pt agrees with goals set.  1. Patient demonstrates independence with HEP. Met CB 8/15/2022  2. Patient will follow all precautions per protocol.. Met CB 9/12/2022  Progress CB 8/15/2022  3. Patient will perform SLR without extension lag. Met CB 9/12/2022 Progress CB 8/15/2022        Long Term GOALS: 8  weeks. Pt agrees with goals set.  1. Patient will ambulate with least restrictive device with no gait deviations in accordance with protocol. Met CB 9/12/2022 Progress CB 8/15/2022     2. Patient will demonstrate (R) knee ROM WNL. Met CB 9/12/2022 Progress CB 8/15/2022     3. Patient will improve (R) knee strength to 4/5 or greater. Progress CB 9/12/2022 Progress CB 8/15/2022     4. Patient will improve LEFS score to 60 or greater.Progress CB 9/12/2022 Progress CB 8/15/2022     5. Patient will report (R)  knee pain 2/10 or less. Progress CB 9/12/2022  Progress CB 8/15/2022        Plan   Continue with current POC.   Outpatient physical therapy 2 times weekly to include: pt ed, hep, therapeutic exercises, neuromuscular re-education/ balance exercises, manual therapy and modalities prn including MHP, CP, electrical stimulation, ultrasound.  Cont PT for  6 weeks. Pt may be seen by PTA as part of the rehabilitation team.   Certification dates: 9/12/2022-10/21/2022     Therapist: Evette Hernandez, PT  9/14/2022    Pt was DC'd from our care secondary to: Patient noncompliant with attendance of appts. PT available for re-consult in the future if appropriate.        Therapist: Evette Hernandez, ARPAN  10/7/2022

## 2022-11-01 ENCOUNTER — TELEPHONE (OUTPATIENT)
Dept: SPORTS MEDICINE | Facility: CLINIC | Age: 37
End: 2022-11-01
Payer: MEDICAID

## 2022-11-01 NOTE — TELEPHONE ENCOUNTER
----- Message from Gerri Morris sent at 11/1/2022  8:02 AM CDT -----  Regarding: Reschedule  Contact: AMARA OSORIO [[id number]]  Name of Who is Calling: Amara Osorio            What is the request in detail:  She is requesting reschedule her Post -op appt that was scheduled for today  , Please advise         Can the clinic reply by [facility]:  No        [facility]:[phone]

## 2023-02-11 LAB — PAP RECOMMENDATION EXT: NORMAL

## 2023-03-01 DIAGNOSIS — N63.21 UNSPECIFIED LUMP IN THE LEFT BREAST, UPPER OUTER QUADRANT: ICD-10-CM

## 2023-03-01 DIAGNOSIS — N63.12 UNSPECIFIED LUMP IN THE RIGHT BREAST, UPPER INNER QUADRANT: Primary | ICD-10-CM

## 2023-03-07 ENCOUNTER — HOSPITAL ENCOUNTER (OUTPATIENT)
Dept: RADIOLOGY | Facility: HOSPITAL | Age: 38
Discharge: HOME | End: 2023-03-07
Attending: NURSE PRACTITIONER
Payer: MEDICAID

## 2023-03-07 VITALS — BODY MASS INDEX: 19.33 KG/M2 | HEIGHT: 65 IN | WEIGHT: 116 LBS

## 2023-03-07 DIAGNOSIS — N63.12 UNSPECIFIED LUMP IN THE RIGHT BREAST, UPPER INNER QUADRANT: ICD-10-CM

## 2023-03-07 DIAGNOSIS — N63.21 UNSPECIFIED LUMP IN THE LEFT BREAST, UPPER OUTER QUADRANT: ICD-10-CM

## 2023-03-07 PROCEDURE — 76642 ULTRASOUND BREAST LIMITED: CPT | Mod: TC,50

## 2023-03-07 PROCEDURE — 77066 DX MAMMO INCL CAD BI: CPT | Mod: TC

## 2023-03-30 DIAGNOSIS — N63.20 MASS OF LEFT BREAST, UNSPECIFIED QUADRANT: Primary | ICD-10-CM

## 2023-03-30 DIAGNOSIS — N63.0 BREAST LUMP OR MASS: ICD-10-CM

## 2023-03-30 RX ORDER — SODIUM CHLORIDE 9 MG/ML
INJECTION, SOLUTION INTRAVENOUS CONTINUOUS
Status: CANCELLED | OUTPATIENT
Start: 2023-03-30

## 2023-03-31 ENCOUNTER — ANESTHESIA EVENT (OUTPATIENT)
Dept: SURGERY | Facility: HOSPITAL | Age: 38
End: 2023-03-31
Payer: MEDICAID

## 2023-03-31 NOTE — ANESTHESIA PREPROCEDURE EVALUATION
03/31/2023  Flora Madrid is a 37 y.o., female.      Pre-op Assessment    I have reviewed the Patient Summary Reports.    I have reviewed the NPO Status.   I have reviewed the Medications.     Review of Systems  Anesthesia Hx:  No problems with previous Anesthesia  Denies Family Hx of Anesthesia complications.   Denies Personal Hx of Anesthesia complications.   Social:  Non-Smoker    Cardiovascular:  Cardiovascular Normal     Pulmonary:  Pulmonary Normal    Renal/:  Renal/ Normal     Hepatic/GI:  Hepatic/GI Normal    Neurological:  Neurology Normal    Endocrine:  Endocrine Normal      Lab Results   Component Value Date    WBC 8.22 11/01/2021    HGB 16.4 (H) 11/01/2021    HCT 47.3 11/01/2021    MCV 92 11/01/2021     11/01/2021         Physical Exam  General: Well nourished    Airway:  Mallampati: I / I  Mouth Opening: Normal  TM Distance: Normal  Tongue: Normal  Neck ROM: Normal ROM    Dental:  Intact    Chest/Lungs:  Clear to auscultation    Heart:  Rate: Normal  Rhythm: Regular Rhythm  Sounds: Normal        Anesthesia Plan  Type of Anesthesia, risks & benefits discussed:    Anesthesia Type: MAC, Gen Supraglottic Airway  Intra-op Monitoring Plan: Standard ASA Monitors  Post Op Pain Control Plan: multimodal analgesia  Induction:  IV  Airway Plan: Direct  Informed Consent: Informed consent signed with the Patient and all parties understand the risks and agree with anesthesia plan.  All questions answered.   ASA Score: 1  Day of Surgery Review of History & Physical: I have interviewed and examined the patient. I have reviewed the patient's H&P dated: There are no significant changes.     Ready For Surgery From Anesthesia Perspective.     .

## 2023-04-03 ENCOUNTER — HOSPITAL ENCOUNTER (OUTPATIENT)
Dept: PREADMISSION TESTING | Facility: HOSPITAL | Age: 38
Discharge: HOME | End: 2023-04-03
Attending: SURGERY
Payer: MEDICAID

## 2023-04-03 VITALS — HEIGHT: 65 IN | WEIGHT: 116 LBS | BODY MASS INDEX: 19.33 KG/M2

## 2023-04-04 ENCOUNTER — ANESTHESIA (OUTPATIENT)
Dept: SURGERY | Facility: HOSPITAL | Age: 38
End: 2023-04-04
Payer: MEDICAID

## 2023-04-04 ENCOUNTER — HOSPITAL ENCOUNTER (OUTPATIENT)
Facility: HOSPITAL | Age: 38
Discharge: HOME | End: 2023-04-04
Attending: SURGERY | Admitting: SURGERY
Payer: MEDICAID

## 2023-04-04 DIAGNOSIS — N63.21 MASS OF UPPER OUTER QUADRANT OF LEFT BREAST: ICD-10-CM

## 2023-04-04 DIAGNOSIS — N63.10 MASS OF RIGHT BREAST, UNSPECIFIED QUADRANT: ICD-10-CM

## 2023-04-04 DIAGNOSIS — N63.0 BREAST LUMP OR MASS: Primary | ICD-10-CM

## 2023-04-04 PROBLEM — N63.20 MASS OF LEFT BREAST: Status: ACTIVE | Noted: 2023-04-04

## 2023-04-04 LAB — B-HCG UR QL: NEGATIVE

## 2023-04-04 PROCEDURE — C1819 TISSUE LOCALIZATION-EXCISION: HCPCS | Performed by: SURGERY

## 2023-04-04 PROCEDURE — 71000016 HC POSTOP RECOV ADDL HR: Performed by: SURGERY

## 2023-04-04 PROCEDURE — 00400 ANES INTEGUMENTARY SYS NOS: CPT | Performed by: SURGERY

## 2023-04-04 PROCEDURE — A4648 IMPLANTABLE TISSUE MARKER: HCPCS | Performed by: SURGERY

## 2023-04-04 PROCEDURE — 81025 URINE PREGNANCY TEST: CPT | Performed by: SURGERY

## 2023-04-04 PROCEDURE — 37000009 HC ANESTHESIA EA ADD 15 MINS: Performed by: SURGERY

## 2023-04-04 PROCEDURE — 25000003 PHARM REV CODE 250: Performed by: SURGERY

## 2023-04-04 PROCEDURE — 36000706: Performed by: SURGERY

## 2023-04-04 PROCEDURE — 37000008 HC ANESTHESIA 1ST 15 MINUTES: Performed by: SURGERY

## 2023-04-04 PROCEDURE — 71000015 HC POSTOP RECOV 1ST HR: Performed by: SURGERY

## 2023-04-04 PROCEDURE — 63600175 PHARM REV CODE 636 W HCPCS: Performed by: NURSE ANESTHETIST, CERTIFIED REGISTERED

## 2023-04-04 PROCEDURE — 36000707: Performed by: SURGERY

## 2023-04-04 RX ORDER — ONDANSETRON 2 MG/ML
INJECTION INTRAMUSCULAR; INTRAVENOUS
Status: DISCONTINUED | OUTPATIENT
Start: 2023-04-04 | End: 2023-04-04

## 2023-04-04 RX ORDER — HYDROMORPHONE HYDROCHLORIDE 1 MG/ML
1 INJECTION, SOLUTION INTRAMUSCULAR; INTRAVENOUS; SUBCUTANEOUS EVERY 4 HOURS PRN
Status: DISCONTINUED | OUTPATIENT
Start: 2023-04-04 | End: 2023-04-04 | Stop reason: HOSPADM

## 2023-04-04 RX ORDER — FENTANYL CITRATE 50 UG/ML
INJECTION, SOLUTION INTRAMUSCULAR; INTRAVENOUS
Status: DISCONTINUED | OUTPATIENT
Start: 2023-04-04 | End: 2023-04-04

## 2023-04-04 RX ORDER — HYDROCODONE BITARTRATE AND ACETAMINOPHEN 5; 325 MG/1; MG/1
1 TABLET ORAL EVERY 4 HOURS PRN
Status: DISCONTINUED | OUTPATIENT
Start: 2023-04-04 | End: 2023-04-04 | Stop reason: HOSPADM

## 2023-04-04 RX ORDER — BUPIVACAINE HYDROCHLORIDE 5 MG/ML
INJECTION, SOLUTION PERINEURAL
Status: DISCONTINUED | OUTPATIENT
Start: 2023-04-04 | End: 2023-04-04 | Stop reason: HOSPADM

## 2023-04-04 RX ORDER — ONDANSETRON 2 MG/ML
4 INJECTION INTRAMUSCULAR; INTRAVENOUS EVERY 6 HOURS PRN
Status: DISCONTINUED | OUTPATIENT
Start: 2023-04-04 | End: 2023-04-04 | Stop reason: HOSPADM

## 2023-04-04 RX ORDER — LIDOCAINE HYDROCHLORIDE 10 MG/ML
INJECTION, SOLUTION INTRAVENOUS
Status: DISCONTINUED | OUTPATIENT
Start: 2023-04-04 | End: 2023-04-04

## 2023-04-04 RX ORDER — TRAMADOL HYDROCHLORIDE 50 MG/1
50 TABLET ORAL EVERY 4 HOURS PRN
Status: DISCONTINUED | OUTPATIENT
Start: 2023-04-04 | End: 2023-04-04 | Stop reason: HOSPADM

## 2023-04-04 RX ORDER — PROCHLORPERAZINE EDISYLATE 5 MG/ML
5 INJECTION INTRAMUSCULAR; INTRAVENOUS EVERY 6 HOURS PRN
Status: DISCONTINUED | OUTPATIENT
Start: 2023-04-04 | End: 2023-04-04 | Stop reason: HOSPADM

## 2023-04-04 RX ORDER — PROPOFOL 10 MG/ML
INJECTION, EMULSION INTRAVENOUS
Status: DISCONTINUED | OUTPATIENT
Start: 2023-04-04 | End: 2023-04-04

## 2023-04-04 RX ORDER — LIDOCAINE HYDROCHLORIDE 10 MG/ML
INJECTION INFILTRATION; PERINEURAL
Status: DISCONTINUED | OUTPATIENT
Start: 2023-04-04 | End: 2023-04-04 | Stop reason: HOSPADM

## 2023-04-04 RX ORDER — SODIUM CHLORIDE 9 MG/ML
INJECTION, SOLUTION INTRAVENOUS CONTINUOUS
Status: DISCONTINUED | OUTPATIENT
Start: 2023-04-04 | End: 2023-04-04 | Stop reason: HOSPADM

## 2023-04-04 RX ORDER — MIDAZOLAM HYDROCHLORIDE 1 MG/ML
INJECTION INTRAMUSCULAR; INTRAVENOUS
Status: DISCONTINUED | OUTPATIENT
Start: 2023-04-04 | End: 2023-04-04

## 2023-04-04 RX ADMIN — LIDOCAINE HYDROCHLORIDE 50 MG: 10 INJECTION, SOLUTION INTRAVENOUS at 07:04

## 2023-04-04 RX ADMIN — FENTANYL CITRATE 50 MCG: 50 INJECTION INTRAMUSCULAR; INTRAVENOUS at 07:04

## 2023-04-04 RX ADMIN — PROPOFOL 30 MG: 10 INJECTION, EMULSION INTRAVENOUS at 08:04

## 2023-04-04 RX ADMIN — ONDANSETRON 4 MG: 2 INJECTION INTRAMUSCULAR; INTRAVENOUS at 08:04

## 2023-04-04 RX ADMIN — FENTANYL CITRATE 50 MCG: 50 INJECTION INTRAMUSCULAR; INTRAVENOUS at 08:04

## 2023-04-04 RX ADMIN — SODIUM CHLORIDE: 9 INJECTION, SOLUTION INTRAVENOUS at 06:04

## 2023-04-04 RX ADMIN — PROPOFOL 50 MG: 10 INJECTION, EMULSION INTRAVENOUS at 07:04

## 2023-04-04 RX ADMIN — PROPOFOL 50 MG: 10 INJECTION, EMULSION INTRAVENOUS at 08:04

## 2023-04-04 RX ADMIN — MIDAZOLAM 2 MG: 1 INJECTION INTRAMUSCULAR; INTRAVENOUS at 07:04

## 2023-04-04 NOTE — OP NOTE
Date of Procedure: (date: 4/4/23)    Procedure: Procedure(s) (LRB):  BIOPSY, BREAST mammotome bilateral breasts with ultrasound guidance    Surgeon(s) and Role:     * Carlee Knight MD - Primary    Assisting Surgeon: None    Indications: This patient has an abnormal mammogram of the bilateral breast, and is here for diagnostic biopsy with vacuum assisted device and ultrasound guidance.    Pre-Operative Diagnosis:  Bilateral breast masses, abnormal mammogram    Post-Operative Diagnosis: same    Anesthesia:  MAC with local    Technical Procedures Used: mammotome vacuum device    Description of the Findings of the Procedure:  Solid lesion at approximately 12:00 p.m. in the right breast with posterior acoustic shadowing.  Solid lesion along the chest wall on the left breast near 1:00 a.m. well-circumscribed    The patient was seen in the Holding Area. The risks, benefits, complications, treatment options, and expected outcomes were discussed with the patient. The possibilities of reaction to medication, pulmonary aspiration, bleeding, infection, the need for additional procedures, failure to diagnose a condition, and creating a complication requiring transfusion or operation were discussed with the patient. The patient concurred with the proposed plan, giving informed consent.  The site of surgery properly noted/marked. The patient was taken to Operating Room # B, identified as Flora Madrid and the procedure verified as Mammotome Biopsy. A Time Out was held and the above information confirmed.    The patient was placed supine. The breasts were prepped and draped in the standard fashion. Lidocaine 1% was used to anesthetize the skin over the upper outer left breast where the ultrasound identified the lesion.  Local was placed under the lesion to try and lift it off of the chest wall.      An stab incision was created lateral to the ultrasound probe.  The core needle was inserted with ultrasound guidance.  Seven cores  were taken from anterior to channel and 1 posterior to the channel to avoid the ribs.   The mass was firm and along the chest wall so to avoid being in chest.  The cores were submitted fresh to pathology.  A clip was placed to shruthi the biopsy cavity.  Hemostasis was achieved with pressure.   Closure was performed with a steristrip.  Sterile dressing applied.     Lidocaine 1% was used to anesthetize the skin over the upper right breast at the area identified with ultrasound.        An stab incision was created lateral to the ultrasound probe.  The core needle was inserted with ultrasound guidance.  12 cores were taken from anterior to the channel.   The mass was firm and had posterior acoustic shadowing.  Local anesthetic distorted the mass slightly but given the pre anesthetic imaging the needle was able to be easily placed deep to the lesion.  The cores were submitted fresh to pathology.  A clip was placed to shruthi the biopsy cavity.  Hemostasis was achieved with pressure.   Closure was performed with a steristrip.  Sterile dressing applied. At the end of the operation all sponge, instrument and needle counts were correct.    Complications: None; patient tolerated the procedure well.    Total IV Fluids:  1000 ml    Estimated Blood Loss (EBL):  5 cc           Drains: NA    Implants: NA    Specimens:  12 right breast cores and 7 left breast cores            Condition: stable    Disposition: Recovery unit, stable    Attestation: I performed the procedure.

## 2023-04-04 NOTE — DISCHARGE INSTRUCTIONS
CALL YOUR DOCTOR FOR ANY QUESTIONS OR CONCERNS.    RETURN TO OFFICE FOR FOLLOW UP APPOINTMENT WITH DR. BELL AS SCHEDULED.    KEEP DRESSINGS ON; KEEP THEM CLEAN AND DRY.  AFTER 48 HOURS YOU MAY REMOVE THEM AND SHOWER.     DIET: REGULAR    WEAR SUPPORT BRAS AT ALL TIMES EXCEPT FOR SHOWER FOR THE NEXT 2 WEEKS.    RX: HYDROCODONE / APAP 5/325 - TAKE 1 EVERY 6 HOURS IF NEEDED FOR PAIN.    DO NOT DRIVE OR DRINK ANY ALCOHOL FOR THE NEXT 24 HOURS AND WHILE TAKING PAIN MEDICATION.

## 2023-04-04 NOTE — ANESTHESIA POSTPROCEDURE EVALUATION
Anesthesia Post Evaluation    Patient: Flora Madrid    Procedure(s) Performed: Procedure(s) (LRB):  BIOPSY, BREAST (Bilateral)    Final Anesthesia Type: MAC      Patient location during evaluation: OPS  Patient participation: Yes- Able to Participate  Level of consciousness: awake  Post-procedure vital signs: reviewed and stable  Pain management: adequate  Airway patency: patent    PONV status at discharge: No PONV  Anesthetic complications: no      Cardiovascular status: blood pressure returned to baseline  Respiratory status: spontaneous ventilation  Hydration status: euvolemic  Follow-up not needed.          Vitals Value Taken Time   /70 04/04/23 1003   Temp 36.8 °C (98.2 °F) 04/04/23 1003   Pulse 59 04/04/23 1003   Resp 18 04/04/23 1003   SpO2 98 % 04/04/23 1003         No case tracking events are documented in the log.      Pain/Ana Score: Ana Score: 10 (4/4/2023  9:03 AM)

## 2023-04-04 NOTE — INTERVAL H&P NOTE
The patient has been examined and the H&P has been reviewed:    I concur with the findings and changes have been noted since the H&P was written: Review of MMG reveals bilateral masses so will mammotome both.      Anesthesia/Surgery risks, benefits and alternative options discussed and understood by patient/family.          There are no hospital problems to display for this patient.

## 2023-04-04 NOTE — TRANSFER OF CARE
Anesthesia Transfer of Care Note    Patient: Flora Madrid    Procedure(s) Performed: Procedure(s) (LRB):  BIOPSY, BREAST (Bilateral)    Patient location: Other: OR B    Anesthesia Type: MAC    Transport from OR: Transported from OR on room air with adequate spontaneous ventilation    Post pain: adequate analgesia    Post assessment: no apparent anesthetic complications    Post vital signs: stable    Level of consciousness: awake    Nausea/Vomiting: no nausea/vomiting    Complications: none    Transfer of care protocol was followed      Last vitals:   HR 67  RR 16  SPO2 99  BP 94/52  T 36.8

## 2023-04-04 NOTE — PLAN OF CARE
"0903- Returned to room; dressing to bilateral breasts dry and intact.  Pt states "feeling a little nauseous but I think I need to eat a little."  Pt eating small bites of muffin.  Pt denies any pain or discomfort at this time.  S/O present at side of bed. IV saline locked; pt asking to get up and get dressed.   0930- Pt up to BR and voided.    1010- S/L dc'ed intact.  Discharge instructions reviewed with pt and s/o.  "

## 2023-04-04 NOTE — DISCHARGE SUMMARY
Discharge Summary  General Surgery      Admit Date: 4/4/2023    Discharge Date :4/4/2023    Attending Physician: Carlee Knight     Discharge Physician: Carlee Knight    Discharged Condition: good    Discharge Diagnosis:  Bilateral breast masses    Treatments/Procedures: Procedure(s) (LRB):  BIOPSY, BREAST (Bilateral) with ultrasound-guided mammotome    Hospital Course: Uneventful; Discharged home from Recovery    Significant Diagnostic Studies: none    Disposition: Home or Self Care    Diet: Regular    Follow up: Office 10-14 days    Activity: No heavy lifting till seen in office.  Wear snug fitting bra at all times for 2 weeks.    Patient Instructions:   Current Discharge Medication List        CONTINUE these medications which have NOT CHANGED    Details   lisdexamfetamine (VYVANSE) 20 MG capsule Take 30 mg by mouth every morning.             Discharge Procedure Orders   Diet general     Remove dressing in 48 hours     Call MD for:  temperature >100.4     Call MD for:  persistent nausea and vomiting     Call MD for:  severe uncontrolled pain     Call MD for:  difficulty breathing, headache or visual disturbances     Call MD for:  redness, tenderness, or signs of infection (pain, swelling, redness, odor or green/yellow discharge around incision site)     Other restrictions (specify):   Order Comments: Wear fitting bra at all times except while showering for the next 2 weeks     Call MD for:  persistent dizziness or light-headedness     Call MD for:  extreme fatigue     Activity as tolerated

## 2023-04-06 LAB — SPECIMEN TO PATHOLOGY - SURGICAL: NORMAL

## 2023-04-09 DIAGNOSIS — R79.89 PROLACTIN INCREASED: Primary | ICD-10-CM

## 2023-04-11 VITALS
OXYGEN SATURATION: 98 % | TEMPERATURE: 98 F | RESPIRATION RATE: 18 BRPM | HEART RATE: 59 BPM | DIASTOLIC BLOOD PRESSURE: 70 MMHG | SYSTOLIC BLOOD PRESSURE: 108 MMHG

## 2023-04-27 ENCOUNTER — HOSPITAL ENCOUNTER (EMERGENCY)
Facility: HOSPITAL | Age: 38
Discharge: HOME | End: 2023-04-27
Attending: STUDENT IN AN ORGANIZED HEALTH CARE EDUCATION/TRAINING PROGRAM
Payer: MEDICAID

## 2023-04-27 VITALS
BODY MASS INDEX: 19.99 KG/M2 | DIASTOLIC BLOOD PRESSURE: 104 MMHG | OXYGEN SATURATION: 98 % | SYSTOLIC BLOOD PRESSURE: 156 MMHG | RESPIRATION RATE: 16 BRPM | TEMPERATURE: 98 F | WEIGHT: 120 LBS | HEIGHT: 65 IN | HEART RATE: 93 BPM

## 2023-04-27 DIAGNOSIS — N63.20 MASS OF LEFT BREAST, UNSPECIFIED QUADRANT: Primary | ICD-10-CM

## 2023-04-27 DIAGNOSIS — G89.29 CHRONIC PAIN OF RIGHT KNEE: Primary | ICD-10-CM

## 2023-04-27 DIAGNOSIS — M25.561 CHRONIC PAIN OF RIGHT KNEE: Primary | ICD-10-CM

## 2023-04-27 DIAGNOSIS — N63.10 MASS OF RIGHT BREAST, UNSPECIFIED QUADRANT: ICD-10-CM

## 2023-04-27 PROCEDURE — 25000003 PHARM REV CODE 250: Performed by: STUDENT IN AN ORGANIZED HEALTH CARE EDUCATION/TRAINING PROGRAM

## 2023-04-27 PROCEDURE — 99284 EMERGENCY DEPT VISIT MOD MDM: CPT

## 2023-04-27 RX ORDER — CYCLOBENZAPRINE HCL 10 MG
10 TABLET ORAL 3 TIMES DAILY PRN
Qty: 15 TABLET | Refills: 0 | Status: SHIPPED | OUTPATIENT
Start: 2023-04-27 | End: 2023-05-02

## 2023-04-27 RX ORDER — KETOROLAC TROMETHAMINE 10 MG/1
10 TABLET, FILM COATED ORAL
Status: COMPLETED | OUTPATIENT
Start: 2023-04-27 | End: 2023-04-27

## 2023-04-27 RX ORDER — CYCLOBENZAPRINE HCL 10 MG
10 TABLET ORAL
Status: COMPLETED | OUTPATIENT
Start: 2023-04-27 | End: 2023-04-27

## 2023-04-27 RX ORDER — KETOROLAC TROMETHAMINE 10 MG/1
10 TABLET, FILM COATED ORAL
Qty: 15 TABLET | Refills: 0 | Status: SHIPPED | OUTPATIENT
Start: 2023-04-27 | End: 2023-05-02

## 2023-04-27 RX ORDER — LISDEXAMFETAMINE DIMESYLATE 30 MG/1
30 CAPSULE ORAL
COMMUNITY
Start: 2023-04-19

## 2023-04-27 RX ADMIN — KETOROLAC TROMETHAMINE 10 MG: 10 TABLET, FILM COATED ORAL at 11:04

## 2023-04-27 RX ADMIN — CYCLOBENZAPRINE 10 MG: 10 TABLET, FILM COATED ORAL at 11:04

## 2023-04-27 NOTE — Clinical Note
"Flora"Jaimee Madrid was seen and treated in our emergency department on 4/27/2023.  She may return to work on 05/01/2023.       If you have any questions or concerns, please don't hesitate to call.      Bubba Bhatt MD"

## 2023-04-28 ENCOUNTER — TELEPHONE (OUTPATIENT)
Dept: SPORTS MEDICINE | Facility: CLINIC | Age: 38
End: 2023-04-28
Payer: MEDICAID

## 2023-04-28 DIAGNOSIS — Z98.890 S/P ACL RECONSTRUCTION: Primary | ICD-10-CM

## 2023-04-28 NOTE — ED PROVIDER NOTES
Encounter Date: 4/27/2023       History     Chief Complaint   Patient presents with    Knee Pain     Pt presents to the ER w/ complaints of R knee pain starting at 1500. Pt acl, mcl, meniscus surgery last year, has not had any trouble since surgery. Pt denies any recent trauma.      37-year-old female with history of right knee surgery last year presents with pain to right knee.  Patient said pain started earlier today but has not tried anything for pain.  Denies any new trauma, fever    Review of patient's allergies indicates:  No Known Allergies  Past Medical History:   Diagnosis Date    ADHD      Past Surgical History:   Procedure Laterality Date    BREAST BIOPSY Bilateral 4/4/2023    Procedure: BIOPSY, BREAST;  Surgeon: Carlee Knight MD;  Location: [facility] OR;  Service: General;  Laterality: Bilateral;  1st - 0600  BILATERAL mammotome  U/S available for 0730    CERVICAL BIOPSY  W/ LOOP ELECTRODE EXCISION      KNEE ARTHROSCOPY W/ ACL RECONSTRUCTION Right 07/20/2022    Procedure: RECONSTRUCTION, KNEE, ACL, ARTHROSCOPIC;  Surgeon: Orlando Malave MD;  Location: [facility] OR;  Service: Orthopedics;  Laterality: Right;  REGIONAL WITH CATHETER     TOE SURGERY Right     TONSILLECTOMY  1993    UMBILICAL HERNIA REPAIR N/A 01/04/2021    Procedure: REPAIR, HERNIA, UMBILICAL;  Surgeon: Castro De Leon MD;  Location: [facility];  Service: General;  Laterality: N/A;     Family History   Problem Relation Age of Onset    Ovarian cancer Mother     COPD Mother     Heart disease Mother     Prostate cancer Father     Heart attack Father     Heart disease Father     Stroke Father     Deafness Father     No Known Problems Sister     No Known Problems Maternal Grandmother     No Known Problems Maternal Grandfather     No Known Problems Paternal Grandmother     No Known Problems Paternal Grandfather     No Known Problems Daughter     No Known Problems Maternal Aunt     No Known Problems Maternal Uncle     No Known Problems Paternal Aunt      No Known Problems Paternal Uncle     No Known Problems Other     Breast cancer Neg Hx     BRCA 1/2 Neg Hx      Social History     Tobacco Use    Smoking status: Never    Smokeless tobacco: Never   Substance Use Topics    Alcohol use: Yes     Alcohol/week: 2.0 - 3.0 standard drinks     Types: 2 - 3 Cans of beer per week     Comment: daily    Drug use: Not Currently     Review of Systems   Constitutional: Negative.    HENT: Negative.     Respiratory: Negative.     Cardiovascular: Negative.    Gastrointestinal: Negative.    Genitourinary: Negative.    Musculoskeletal:  Positive for arthralgias.   Skin: Negative.    Neurological: Negative.    Psychiatric/Behavioral: Negative.     All other systems reviewed and are negative.    Physical Exam     Initial Vitals [04/27/23 2238]   BP Pulse Resp Temp SpO2   (!) 156/104 93 16 97.8 °F (36.6 °C) 98 %      MAP       --         Physical Exam    Nursing note and vitals reviewed.  Constitutional: Vital signs are normal. She appears well-developed and well-nourished.   HENT:   Head: Normocephalic and atraumatic.   Eyes: Conjunctivae and lids are normal.   Neck: Trachea normal. Neck supple.   Cardiovascular:  Normal rate, regular rhythm, normal heart sounds, intact distal pulses and normal pulses.           No murmur heard.  Pulmonary/Chest: Breath sounds normal. No respiratory distress.   Abdominal: Abdomen is soft. Bowel sounds are normal.   Musculoskeletal:         General: Tenderness present. Normal range of motion.      Cervical back: Neck supple.      Comments: Tenderness to palpation of lateral aspect of right knee.  No obvious swelling.  Full range of motion actively and passively.  Neurovascular intact     Neurological: She is alert and oriented to person, place, and time. She has normal strength. No cranial nerve deficit or sensory deficit.   Skin: Skin is warm. Capillary refill takes less than 2 seconds.   Psychiatric: She has a normal mood and affect. Her speech is  normal. Thought content normal.       ED Course   Procedures  Labs Reviewed - No data to display       Imaging Results    None          Medications   ketorolac tablet 10 mg (10 mg Oral Given 4/27/23 2307)   cyclobenzaprine tablet 10 mg (10 mg Oral Given 4/27/23 2307)     Medical Decision Making:   Initial Assessment:   Exacerbation of chronic pain.  Patient has follow-up on Monday.  Will advise Tylenol ibuprofen                        Clinical Impression:   Final diagnoses:  [M25.561, G89.29] Chronic pain of right knee (Primary)        ED Disposition Condition    Discharge Stable          ED Prescriptions       Medication Sig Dispense Start Date End Date Auth. Provider    cyclobenzaprine (FLEXERIL) 10 MG tablet Take 1 tablet (10 mg total) by mouth 3 (three) times daily as needed for Muscle spasms. 15 tablet 4/27/2023 5/2/2023 Bubba Bhatt MD    ketorolac (TORADOL) 10 mg tablet Take 1 tablet (10 mg total) by mouth after meals as needed for Pain. 15 tablet 4/27/2023 5/2/2023 Bubba Bhatt MD          Follow-up Information       Follow up With Specialties Details Why Contact Info    [facility] Orthopedic Surgery In 2 days  [address]  [address]  [facility] [address]  [phone]               Bubba Bhatt MD  04/27/23 3998

## 2023-04-28 NOTE — TELEPHONE ENCOUNTER
Spoke with patient significant other in attempt to schedule follow up appointment. Time, date and location were discussed and agreed on.

## 2023-05-02 ENCOUNTER — OFFICE VISIT (OUTPATIENT)
Dept: SPORTS MEDICINE | Facility: CLINIC | Age: 38
End: 2023-05-02
Payer: MEDICAID

## 2023-05-02 VITALS — BODY MASS INDEX: 19.99 KG/M2 | HEIGHT: 65 IN | WEIGHT: 120 LBS

## 2023-05-02 DIAGNOSIS — S83.511D RUPTURE OF ANTERIOR CRUCIATE LIGAMENT OF RIGHT KNEE, SUBSEQUENT ENCOUNTER: ICD-10-CM

## 2023-05-02 DIAGNOSIS — N64.52 NIPPLE DISCHARGE: ICD-10-CM

## 2023-05-02 DIAGNOSIS — R79.89 ELEVATED PROLACTIN LEVEL: Primary | ICD-10-CM

## 2023-05-02 DIAGNOSIS — Z98.890 S/P ACL RECONSTRUCTION: Primary | ICD-10-CM

## 2023-05-02 PROCEDURE — 1159F MED LIST DOCD IN RCRD: CPT | Mod: CPTII,,, | Performed by: STUDENT IN AN ORGANIZED HEALTH CARE EDUCATION/TRAINING PROGRAM

## 2023-05-02 PROCEDURE — 1160F PR REVIEW ALL MEDS BY PRESCRIBER/CLIN PHARMACIST DOCUMENTED: ICD-10-PCS | Mod: CPTII,,, | Performed by: STUDENT IN AN ORGANIZED HEALTH CARE EDUCATION/TRAINING PROGRAM

## 2023-05-02 PROCEDURE — 3008F PR BODY MASS INDEX (BMI) DOCUMENTED: ICD-10-PCS | Mod: CPTII,,, | Performed by: STUDENT IN AN ORGANIZED HEALTH CARE EDUCATION/TRAINING PROGRAM

## 2023-05-02 PROCEDURE — 99213 OFFICE O/P EST LOW 20 MIN: CPT | Mod: PBBFAC,PN | Performed by: STUDENT IN AN ORGANIZED HEALTH CARE EDUCATION/TRAINING PROGRAM

## 2023-05-02 PROCEDURE — 99214 PR OFFICE/OUTPT VISIT, EST, LEVL IV, 30-39 MIN: ICD-10-PCS | Mod: S$PBB,,, | Performed by: STUDENT IN AN ORGANIZED HEALTH CARE EDUCATION/TRAINING PROGRAM

## 2023-05-02 PROCEDURE — 99214 OFFICE O/P EST MOD 30 MIN: CPT | Mod: S$PBB,,, | Performed by: STUDENT IN AN ORGANIZED HEALTH CARE EDUCATION/TRAINING PROGRAM

## 2023-05-02 PROCEDURE — 99999 PR PBB SHADOW E&M-EST. PATIENT-LVL III: CPT | Mod: PBBFAC,,, | Performed by: STUDENT IN AN ORGANIZED HEALTH CARE EDUCATION/TRAINING PROGRAM

## 2023-05-02 PROCEDURE — 1159F PR MEDICATION LIST DOCUMENTED IN MEDICAL RECORD: ICD-10-PCS | Mod: CPTII,,, | Performed by: STUDENT IN AN ORGANIZED HEALTH CARE EDUCATION/TRAINING PROGRAM

## 2023-05-02 PROCEDURE — 1160F RVW MEDS BY RX/DR IN RCRD: CPT | Mod: CPTII,,, | Performed by: STUDENT IN AN ORGANIZED HEALTH CARE EDUCATION/TRAINING PROGRAM

## 2023-05-02 PROCEDURE — 3008F BODY MASS INDEX DOCD: CPT | Mod: CPTII,,, | Performed by: STUDENT IN AN ORGANIZED HEALTH CARE EDUCATION/TRAINING PROGRAM

## 2023-05-02 PROCEDURE — 99999 PR PBB SHADOW E&M-EST. PATIENT-LVL III: ICD-10-PCS | Mod: PBBFAC,,, | Performed by: STUDENT IN AN ORGANIZED HEALTH CARE EDUCATION/TRAINING PROGRAM

## 2023-05-02 NOTE — PROGRESS NOTES
Subjective:          Chief Complaint: Folra Madrid is a 37 y.o. female who had concerns including Pain of the Right Knee.    HPI     Flora Madrid is a 37 y.o. female presents about 9 months status post below.  She is not been seen since her 6 week postoperative visit. She reports 3/10 pain. She notes that about 1 week ago, she felt a sudden onset of pain in the operative knee.  This began after hard step where she landed with increased force on this knee.  Denies hearing or pop.  She notes that her pain was severe enough to go to the ED. She reports that her pain has since subsided and that her pain is much better.  She denies any instability. She has not attended formal physical therapy in about 2 months.     PROCEDURE PERFORMED by Orlando Malave MD on 07/20/2022:   Right ACL reconstruction with quadriceps tendon autograft  Right medial meniscus repair    Past Medical History:   Diagnosis Date    ADHD        Current Outpatient Medications on File Prior to Visit   Medication Sig Dispense Refill    cyclobenzaprine (FLEXERIL) 10 MG tablet Take 1 tablet (10 mg total) by mouth 3 (three) times daily as needed for Muscle spasms. 15 tablet 0    ketorolac (TORADOL) 10 mg tablet Take 1 tablet (10 mg total) by mouth after meals as needed for Pain. 15 tablet 0    lisdexamfetamine (VYVANSE) 20 MG capsule Take 30 mg by mouth every morning.      VYVANSE 30 mg capsule Take 30 mg by mouth.       No current facility-administered medications on file prior to visit.       Past Surgical History:   Procedure Laterality Date    BREAST BIOPSY Bilateral 4/4/2023    Procedure: BIOPSY, BREAST;  Surgeon: Carlee Knight MD;  Location: [facility];  Service: General;  Laterality: Bilateral;  1st - 0600  BILATERAL mammotome  U/S available for 0730    CERVICAL BIOPSY  W/ LOOP ELECTRODE EXCISION      KNEE ARTHROSCOPY W/ ACL RECONSTRUCTION Right 07/20/2022    Procedure: RECONSTRUCTION, KNEE, ACL, ARTHROSCOPIC;  Surgeon: Orlando Malave MD;   Location: [facility] OR;  Service: Orthopedics;  Laterality: Right;  REGIONAL WITH CATHETER     TOE SURGERY Right     TONSILLECTOMY  1993    UMBILICAL HERNIA REPAIR N/A 01/04/2021    Procedure: REPAIR, HERNIA, UMBILICAL;  Surgeon: Castro De Leon MD;  Location: [facility] OR;  Service: General;  Laterality: N/A;       Family History   Problem Relation Age of Onset    Ovarian cancer Mother     COPD Mother     Heart disease Mother     Prostate cancer Father     Heart attack Father     Heart disease Father     Stroke Father     Deafness Father     No Known Problems Sister     No Known Problems Maternal Grandmother     No Known Problems Maternal Grandfather     No Known Problems Paternal Grandmother     No Known Problems Paternal Grandfather     No Known Problems Daughter     No Known Problems Maternal Aunt     No Known Problems Maternal Uncle     No Known Problems Paternal Aunt     No Known Problems Paternal Uncle     No Known Problems Other     Breast cancer Neg Hx     BRCA 1/2 Neg Hx        Social History     Socioeconomic History    Marital status: Single   Tobacco Use    Smoking status: Never    Smokeless tobacco: Never   Substance and Sexual Activity    Alcohol use: Yes     Alcohol/week: 2.0 - 3.0 standard drinks     Types: 2 - 3 Cans of beer per week     Comment: daily    Drug use: Not Currently      Review of Systems   Constitutional: Negative.   HENT: Negative.     Eyes: Negative.    Cardiovascular: Negative.    Respiratory: Negative.     Endocrine: Negative.    Hematologic/Lymphatic: Negative.    Skin: Negative.    Musculoskeletal:  Positive for muscle weakness. Negative for falls, joint pain, joint swelling, myalgias and stiffness.   Neurological: Negative.    Psychiatric/Behavioral: Negative.     Allergic/Immunologic: Negative.                  Objective:        General: Flora is well-developed, well-nourished, appears stated age, in no acute distress, alert and oriented to time, place and person.     General    Nursing  note and vitals reviewed.  Constitutional: She is oriented to person, place, and time. She appears well-developed and well-nourished. No distress.   HENT:   Head: Normocephalic and atraumatic.   Nose: Nose normal.   Eyes: EOM are normal.   Cardiovascular:  Intact distal pulses.            Pulmonary/Chest: Effort normal. No respiratory distress.   Neurological: She is alert and oriented to person, place, and time.   Psychiatric: She has a normal mood and affect. Her behavior is normal. Judgment and thought content normal.     General Musculoskeletal Exam   Gait: normal       Right Knee Exam     Inspection   Erythema: absent  Scars: present  Swelling: absent  Effusion: absent  Deformity: absent  Bruising: absent    Range of Motion   Extension:  -5   Flexion:  150     Tests   Meniscus   Kadeem:  Medial - negative Lateral - negative  Ligament Examination   Lachman: normal (-1 to 2mm)   PCL-Posterior Drawer: normal (0 to 2mm)     MCL - Valgus: normal (0 to 2mm)  LCL - Varus: normal  Patella   Patellar Grind: negative    Other   Muscle Tightness: hamstring tightness  Sensation: normal    Comments:  Incision sites healing well, without signs of erythema, infection, or wound dehiscence    Left Knee Exam     Range of Motion   Extension:  -5   Flexion:  150     Tests   Stability   Lachman: normal (-1 to 2mm)   PCL-Posterior Drawer: normal (0 to 2mm)  MCL - Valgus: normal (0 to 2mm)  LCL - Varus: normal (0 to 2mm)  Patella   Patellar Grind: negative    Other   Muscle Tightness: hamstring tightness  Sensation: normal    Muscle Strength   Right Lower Extremity   Quadriceps:  4/5   Hamstrin/5   Left Lower Extremity   Quadriceps:  5/5   Hamstrin/5     Vascular Exam     Right Pulses  Dorsalis Pedis:      2+  Posterior Tibial:      2+        Left Pulses  Dorsalis Pedis:      2+  Posterior Tibial:      2+      Imaging:   X-rays of the right knee from 2022 personally reviewed by me on that day.  These include  nonweightbearing AP and lateral.  Evidence of prior ACL reconstruction with suspensory fixation on the femur and tibia.  No noted complications.    X-rays of the bilateral knees from 05/02/2023 personally viewed by me on that day.  These include weight-bearing AP, PA flexion, lateral, and Merchant views.  Evidence of ACL reconstruction on the right with suspensory fixation of the femur and tibia.  Unchanged since prior imaging.  ACL tunnels appear near anatomic position.          Assessment:     Flora Madrid is a 37 y.o. female status post above overall doing well.  Seems like she now has a contusion to the knee from a hard step.  Encounter Diagnoses   Name Primary?    S/P ACL reconstruction Yes    Rupture of anterior cruciate ligament of right knee, subsequent encounter               Plan:       The diagnosis treatment options with the patient all questions were answered.  I showed her the x-rays discussed the findings with her.  I recommended we do a short course of physical therapy to continue to work on strengthening of her leg.  Additionally, she is very tight hamstrings they will work on hamstring stretching and soft tissue modalities for this.  She will return to clinic in 6-8 weeks for repeat evaluation.      All of their questions were answered.  They will call the clinic with any questions or concerns in the interim.    Should the patient's symptoms worsen, persist, or fail to improve they should return for reevaluation and I would be happy to see them back anytime.        Orlando Malave M.D.    Please be aware that this note has been generated with the assistance of [employer] voice-to-text.  Please excuse any spelling or grammatical errors.    Thank you for choosing Dr. Orlando Malave for your sports medicine care. It is our goal to provide you with exceptional care that will help keep you healthy, active, and get you back in the game.     If you felt that you received exemplary care today, please consider  leaving feedback for Dr. Malave on [email or web address] at [email or web address].    Please do not hesitate to reach out to us via email, phone, or [employer] with any questions, concerns, or feedback.                     Patient questionnaires may have been collected.

## 2023-06-16 ENCOUNTER — TELEPHONE (OUTPATIENT)
Dept: SPORTS MEDICINE | Facility: CLINIC | Age: 38
End: 2023-06-16
Payer: MEDICAID

## 2023-06-16 NOTE — TELEPHONE ENCOUNTER
----- Message from Radha Mccoy sent at 6/16/2023 11:55 AM CDT -----  Regarding: S/P OP THERAPY ORDER  Good afternoon,     The pt attached is post-surgical and has orders in for physical therapy with a diagnosis of  S/P ACL reconstruction [Z98.890]. The scheduling team has tried to contact the pt four times and left voicemails each call. I wanted to inform you that we will cancel this request due to the pt being unable to contact.     Thank you,     Radha Mccoy  _________________________________________  [facility]  Provider Scheduling [facility]. [facility]  [phone]  [email or web address]

## 2023-09-11 ENCOUNTER — TELEPHONE (OUTPATIENT)
Dept: SPORTS MEDICINE | Facility: CLINIC | Age: 38
End: 2023-09-11
Payer: MEDICAID

## 2023-09-11 NOTE — TELEPHONE ENCOUNTER
Spoke with dentist office in notification that per Dr. Malave patient does not need anti biotics prior to dental procedure. She understood.

## 2023-11-27 DIAGNOSIS — R79.89 PROLACTIN INCREASED: Primary | ICD-10-CM

## 2023-11-27 DIAGNOSIS — N64.52 NIPPLE DISCHARGE: ICD-10-CM

## 2023-11-30 ENCOUNTER — HOSPITAL ENCOUNTER (OUTPATIENT)
Dept: RADIOLOGY | Facility: HOSPITAL | Age: 38
Discharge: HOME | End: 2023-11-30
Attending: SURGERY
Payer: MEDICAID

## 2023-11-30 DIAGNOSIS — N64.52 NIPPLE DISCHARGE: ICD-10-CM

## 2023-11-30 DIAGNOSIS — R79.89 PROLACTIN INCREASED: ICD-10-CM

## 2023-11-30 PROCEDURE — 70553 MRI BRAIN STEM W/O & W/DYE: CPT | Mod: TC

## 2023-11-30 PROCEDURE — 25500020 PHARM REV CODE 255: Performed by: SURGERY

## 2023-11-30 PROCEDURE — A9585 GADOBUTROL INJECTION: HCPCS | Performed by: SURGERY

## 2023-11-30 RX ORDER — GADOBUTROL 604.72 MG/ML
7 INJECTION INTRAVENOUS
Status: COMPLETED | OUTPATIENT
Start: 2023-11-30 | End: 2023-11-30

## 2023-11-30 RX ADMIN — GADOBUTROL 7 ML: 604.72 INJECTION INTRAVENOUS at 01:11

## 2024-10-24 ENCOUNTER — TELEPHONE (OUTPATIENT)
Dept: SPORTS MEDICINE | Facility: CLINIC | Age: 39
End: 2024-10-24
Payer: MEDICAID

## 2024-10-24 NOTE — TELEPHONE ENCOUNTER
Spoke with pt in regards to needing antibiotics for her dental procedure. Let her know she does not

## 2024-10-24 NOTE — TELEPHONE ENCOUNTER
----- Message from Med Assistant Gomes sent at 10/24/2024  8:13 AM CDT -----  Regarding: pt advice  Contact: pt [phone]  Type:  Needs Medical Advice    Who Called:  Flora calling to ask if she needs to be on an antibiotic for a dental procedure stated that she had Knee surgery 2 years ago  but is unsure patient is at the dental office now     Would the patient rather a call back or a response via [facility]? Call back   Best Call Back Number: pt [phone]    Additional Information:   53.8

## 2025-01-14 ENCOUNTER — OFFICE VISIT (OUTPATIENT)
Dept: PRIMARY CARE CLINIC | Facility: CLINIC | Age: 40
End: 2025-01-14
Payer: MEDICAID

## 2025-01-14 VITALS
WEIGHT: 134 LBS | TEMPERATURE: 99 F | HEIGHT: 65 IN | BODY MASS INDEX: 22.33 KG/M2 | SYSTOLIC BLOOD PRESSURE: 136 MMHG | OXYGEN SATURATION: 100 % | HEART RATE: 89 BPM | DIASTOLIC BLOOD PRESSURE: 88 MMHG

## 2025-01-14 DIAGNOSIS — Z11.59 ENCOUNTER FOR HEPATITIS C SCREENING TEST FOR LOW RISK PATIENT: ICD-10-CM

## 2025-01-14 DIAGNOSIS — Z13.21 ENCOUNTER FOR VITAMIN DEFICIENCY SCREENING: ICD-10-CM

## 2025-01-14 DIAGNOSIS — Z13.220 NEED FOR LIPID SCREENING: ICD-10-CM

## 2025-01-14 DIAGNOSIS — Z13.0 SCREENING FOR IRON DEFICIENCY ANEMIA: ICD-10-CM

## 2025-01-14 DIAGNOSIS — Z11.4 ENCOUNTER FOR SCREENING FOR HIV: ICD-10-CM

## 2025-01-14 DIAGNOSIS — Z76.89 ENCOUNTER TO ESTABLISH CARE: Primary | ICD-10-CM

## 2025-01-14 DIAGNOSIS — Z13.29 THYROID DISORDER SCREENING: ICD-10-CM

## 2025-01-14 DIAGNOSIS — F90.2 ATTENTION DEFICIT HYPERACTIVITY DISORDER (ADHD), COMBINED TYPE: ICD-10-CM

## 2025-01-14 DIAGNOSIS — Z13.1 SCREENING FOR DIABETES MELLITUS (DM): ICD-10-CM

## 2025-01-14 PROBLEM — Z98.890 S/P MEDIAL MENISCUS REPAIR OF RIGHT KNEE: Status: RESOLVED | Noted: 2022-08-03 | Resolved: 2025-01-14

## 2025-01-14 PROBLEM — Z98.890 S/P ACL RECONSTRUCTION: Status: RESOLVED | Noted: 2022-07-28 | Resolved: 2025-01-14

## 2025-01-14 PROBLEM — S83.411A SPRAIN OF MEDIAL COLLATERAL LIGAMENT OF RIGHT KNEE: Status: RESOLVED | Noted: 2022-06-23 | Resolved: 2025-01-14

## 2025-01-14 PROBLEM — K42.9 UMBILICAL HERNIA: Status: RESOLVED | Noted: 2021-01-04 | Resolved: 2025-01-14

## 2025-01-14 PROBLEM — S83.511A RUPTURE OF ANTERIOR CRUCIATE LIGAMENT OF RIGHT KNEE: Status: RESOLVED | Noted: 2022-06-23 | Resolved: 2025-01-14

## 2025-01-14 PROBLEM — N63.20 MASS OF LEFT BREAST: Status: RESOLVED | Noted: 2023-04-04 | Resolved: 2025-01-14

## 2025-01-14 PROBLEM — S83.241A ACUTE MEDIAL MENISCAL TEAR, RIGHT, INITIAL ENCOUNTER: Status: RESOLVED | Noted: 2022-07-20 | Resolved: 2025-01-14

## 2025-01-14 PROBLEM — N63.10 MASS OF RIGHT BREAST: Status: RESOLVED | Noted: 2023-04-04 | Resolved: 2025-01-14

## 2025-01-14 PROBLEM — S83.241A ACUTE MEDIAL MENISCUS TEAR OF RIGHT KNEE: Status: RESOLVED | Noted: 2022-06-23 | Resolved: 2025-01-14

## 2025-01-14 LAB
25(OH)D3+25(OH)D2 SERPL-MCNC: 32 NG/ML (ref 30–96)
ALBUMIN SERPL BCP-MCNC: 4.4 G/DL (ref 3.5–5.2)
ALBUMIN/CREAT UR: 15.3 UG/MG (ref 0–30)
ALP SERPL-CCNC: 49 U/L (ref 55–135)
ALT SERPL W/O P-5'-P-CCNC: 16 U/L (ref 10–44)
ANION GAP SERPL CALC-SCNC: 10 MMOL/L (ref 8–16)
AST SERPL-CCNC: 23 U/L (ref 10–40)
BASOPHILS # BLD AUTO: 0.04 K/UL (ref 0–0.2)
BASOPHILS NFR BLD: 0.7 % (ref 0–1.9)
BILIRUB SERPL-MCNC: 0.3 MG/DL (ref 0.1–1)
BUN SERPL-MCNC: 18 MG/DL (ref 6–20)
CALCIUM SERPL-MCNC: 9 MG/DL (ref 8.7–10.5)
CHLORIDE SERPL-SCNC: 103 MMOL/L (ref 95–110)
CHOLEST SERPL-MCNC: 136 MG/DL (ref 120–199)
CHOLEST/HDLC SERPL: 2.1 {RATIO} (ref 2–5)
CO2 SERPL-SCNC: 26 MMOL/L (ref 23–29)
CREAT SERPL-MCNC: 0.7 MG/DL (ref 0.5–1.4)
CREAT UR-MCNC: 59 MG/DL (ref 15–325)
DIFFERENTIAL METHOD BLD: ABNORMAL
EOSINOPHIL # BLD AUTO: 0.1 K/UL (ref 0–0.5)
EOSINOPHIL NFR BLD: 0.9 % (ref 0–8)
ERYTHROCYTE [DISTWIDTH] IN BLOOD BY AUTOMATED COUNT: 11.2 % (ref 11.5–14.5)
EST. GFR  (NO RACE VARIABLE): >60 ML/MIN/1.73 M^2
ESTIMATED AVG GLUCOSE: 91 MG/DL (ref 68–131)
GLUCOSE SERPL-MCNC: 73 MG/DL (ref 70–110)
HBA1C MFR BLD: 4.8 % (ref 4–5.6)
HCT VFR BLD AUTO: 42.5 % (ref 37–48.5)
HCV AB SERPL QL IA: NORMAL
HDLC SERPL-MCNC: 66 MG/DL (ref 40–75)
HDLC SERPL: 48.5 % (ref 20–50)
HGB BLD-MCNC: 14.4 G/DL (ref 12–16)
HIV 1+2 AB+HIV1 P24 AG SERPL QL IA: NORMAL
IMM GRANULOCYTES # BLD AUTO: 0.01 K/UL (ref 0–0.04)
IMM GRANULOCYTES NFR BLD AUTO: 0.2 % (ref 0–0.5)
LDLC SERPL CALC-MCNC: 62 MG/DL (ref 63–159)
LYMPHOCYTES # BLD AUTO: 1.5 K/UL (ref 1–4.8)
LYMPHOCYTES NFR BLD: 26.6 % (ref 18–48)
MCH RBC QN AUTO: 31 PG (ref 27–31)
MCHC RBC AUTO-ENTMCNC: 33.9 G/DL (ref 32–36)
MCV RBC AUTO: 92 FL (ref 82–98)
MICROALBUMIN UR DL<=1MG/L-MCNC: 9 UG/ML
MONOCYTES # BLD AUTO: 0.5 K/UL (ref 0.3–1)
MONOCYTES NFR BLD: 8.1 % (ref 4–15)
NEUTROPHILS # BLD AUTO: 3.5 K/UL (ref 1.8–7.7)
NEUTROPHILS NFR BLD: 63.5 % (ref 38–73)
NONHDLC SERPL-MCNC: 70 MG/DL
NRBC BLD-RTO: 0 /100 WBC
PLATELET # BLD AUTO: 272 K/UL (ref 150–450)
PMV BLD AUTO: 9.6 FL (ref 9.2–12.9)
POTASSIUM SERPL-SCNC: 3.7 MMOL/L (ref 3.5–5.1)
PROT SERPL-MCNC: 6.7 G/DL (ref 6–8.4)
RBC # BLD AUTO: 4.64 M/UL (ref 4–5.4)
SODIUM SERPL-SCNC: 139 MMOL/L (ref 136–145)
TRIGL SERPL-MCNC: 40 MG/DL (ref 30–150)
TSH SERPL DL<=0.005 MIU/L-ACNC: 0.59 UIU/ML (ref 0.4–4)
WBC # BLD AUTO: 5.57 K/UL (ref 3.9–12.7)

## 2025-01-14 PROCEDURE — 85025 COMPLETE CBC W/AUTO DIFF WBC: CPT | Performed by: NURSE PRACTITIONER

## 2025-01-14 PROCEDURE — 3008F BODY MASS INDEX DOCD: CPT | Mod: CPTII,,, | Performed by: NURSE PRACTITIONER

## 2025-01-14 PROCEDURE — 36415 COLL VENOUS BLD VENIPUNCTURE: CPT | Performed by: NURSE PRACTITIONER

## 2025-01-14 PROCEDURE — 99213 OFFICE O/P EST LOW 20 MIN: CPT | Mod: PBBFAC | Performed by: NURSE PRACTITIONER

## 2025-01-14 PROCEDURE — 36415 COLL VENOUS BLD VENIPUNCTURE: CPT | Mod: PBBFAC

## 2025-01-14 PROCEDURE — 82570 ASSAY OF URINE CREATININE: CPT | Performed by: NURSE PRACTITIONER

## 2025-01-14 PROCEDURE — 1160F RVW MEDS BY RX/DR IN RCRD: CPT | Mod: CPTII,,, | Performed by: NURSE PRACTITIONER

## 2025-01-14 PROCEDURE — 80061 LIPID PANEL: CPT | Performed by: NURSE PRACTITIONER

## 2025-01-14 PROCEDURE — 84443 ASSAY THYROID STIM HORMONE: CPT | Performed by: NURSE PRACTITIONER

## 2025-01-14 PROCEDURE — 80053 COMPREHEN METABOLIC PANEL: CPT | Performed by: NURSE PRACTITIONER

## 2025-01-14 PROCEDURE — 3075F SYST BP GE 130 - 139MM HG: CPT | Mod: CPTII,,, | Performed by: NURSE PRACTITIONER

## 2025-01-14 PROCEDURE — 86803 HEPATITIS C AB TEST: CPT | Performed by: NURSE PRACTITIONER

## 2025-01-14 PROCEDURE — 1159F MED LIST DOCD IN RCRD: CPT | Mod: CPTII,,, | Performed by: NURSE PRACTITIONER

## 2025-01-14 PROCEDURE — 82306 VITAMIN D 25 HYDROXY: CPT | Performed by: NURSE PRACTITIONER

## 2025-01-14 PROCEDURE — 99999 PR PBB SHADOW E&M-EST. PATIENT-LVL III: CPT | Mod: PBBFAC,,, | Performed by: NURSE PRACTITIONER

## 2025-01-14 PROCEDURE — 99999PBSHW PR PBB SHADOW TECHNICAL ONLY FILED TO HB: Mod: PBBFAC,,,

## 2025-01-14 PROCEDURE — 99204 OFFICE O/P NEW MOD 45 MIN: CPT | Mod: S$PBB,,, | Performed by: NURSE PRACTITIONER

## 2025-01-14 PROCEDURE — 83036 HEMOGLOBIN GLYCOSYLATED A1C: CPT | Performed by: NURSE PRACTITIONER

## 2025-01-14 PROCEDURE — 3079F DIAST BP 80-89 MM HG: CPT | Mod: CPTII,,, | Performed by: NURSE PRACTITIONER

## 2025-01-14 PROCEDURE — 87389 HIV-1 AG W/HIV-1&-2 AB AG IA: CPT | Performed by: NURSE PRACTITIONER

## 2025-01-14 RX ORDER — LISDEXAMFETAMINE DIMESYLATE 40 MG/1
40 CAPSULE ORAL
COMMUNITY
Start: 2024-12-31 | End: 2025-01-24 | Stop reason: SDUPTHER

## 2025-01-14 NOTE — PROGRESS NOTES
[facility] Note    HPI:  Flora Madrid is a 39 y.o. female who presents today for [facility] (Establish care)         Review of Systems   Constitutional: Negative.    HENT: Negative.     Eyes: Negative.    Respiratory: Negative.     Cardiovascular: Negative.    Gastrointestinal: Negative.    Genitourinary: Negative.    Musculoskeletal: Negative.    Skin: Negative.    Neurological: Negative.    Endo/Heme/Allergies: Negative.    Psychiatric/Behavioral: Negative.        A review of systems was performed and was negative except as noted above.    I personally reviewed allergies, past medical, surgical, social and family history and updated as appropriate.    Medications:    Current Outpatient Medications:     lisdexamfetamine (VYVANSE) 40 MG Cap, Take 40 mg by mouth., Disp: , Rfl:     lisdexamfetamine (VYVANSE) 20 MG capsule, Take 30 mg by mouth every morning. (Patient not taking: Reported on 1/14/2025), Disp: , Rfl:     VYVANSE 30 mg capsule, Take 30 mg by mouth. (Patient not taking: Reported on 1/14/2025), Disp: , Rfl:      Health Maintenance:  Immunization History   Administered Date(s) Administered    DTP 1986, 1986, 02/10/1987, 12/18/1987    Hepatitis B, Pediatric/Adolescent 12/11/2000, 11/28/2001    MMR 12/18/1987, 01/11/2000    Td (ADULT) 01/11/2000    Tdap 12/27/2013, 01/03/2018      Health Maintenance   Topic Date Due    Hepatitis C Screening  Never done    Cervical Cancer Screening  Never done    Lipid Panel  Never done    HIV Screening  Never done    Influenza Vaccine (1) Never done    COVID-19 Vaccine (1 - 2024-25 season) Never done    TETANUS VACCINE  01/03/2028    RSV Vaccine (Age 60+ and Pregnant patients) (1 - 1-dose 75+ series) 08/04/2061    Pneumococcal Vaccines (Age 0-49)  Aged Out     Health Maintenance Topics with due status: Not Due       Topic Last Completion Date    TETANUS VACCINE 01/03/2018    RSV Vaccine (Age 60+ and Pregnant patients) Not Due     Health  "Maintenance Due   Topic Date Due    Hepatitis C Screening  Never done    Cervical Cancer Screening  Never done    Lipid Panel  Never done    HIV Screening  Never done    Influenza Vaccine (1) Never done    COVID-19 Vaccine (1 - 2024-25 season) Never done       PHYSICAL EXAM:  Vitals:    01/14/25 1408   BP: 136/88   Patient Position: Sitting   Pulse: 89   Temp: 98.8 °F (37.1 °C)   SpO2: 100%   Weight: 60.8 kg (134 lb)   Height: 5' 5" (1.651 m)     Body mass index is 22.3 kg/m².  Physical Exam  Constitutional:       Appearance: Normal appearance. She is normal weight.   HENT:      Head: Normocephalic.      Right Ear: Tympanic membrane, ear canal and external ear normal.      Left Ear: Tympanic membrane, ear canal and external ear normal.      Nose: Nose normal.      Mouth/Throat:      Mouth: Mucous membranes are moist.   Cardiovascular:      Rate and Rhythm: Normal rate and regular rhythm.      Pulses: Normal pulses.      Heart sounds: Normal heart sounds.   Pulmonary:      Effort: Pulmonary effort is normal.      Breath sounds: Normal breath sounds.   Musculoskeletal:         General: Normal range of motion.      Cervical back: Normal range of motion.   Skin:     General: Skin is warm and dry.   Neurological:      General: No focal deficit present.      Mental Status: She is alert and oriented to person, place, and time.          ASSESSMENT/PLAN:  1. Encounter to establish care    2. Attention deficit hyperactivity disorder (ADHD), combined type        Other than changes above, continue current medications and maintain follow up with specialists.      No follow-ups on file.   No results found for this or any previous visit (from the past 12 weeks).      WILLIE Sotomayor  [facility]                  "

## 2025-01-15 ENCOUNTER — PATIENT OUTREACH (OUTPATIENT)
Dept: ADMINISTRATIVE | Facility: HOSPITAL | Age: 40
End: 2025-01-15
Payer: MEDICAID

## 2025-01-15 NOTE — LETTER
AUTHORIZATION FOR RELEASE OF   CONFIDENTIAL INFORMATION      We are seeing Flora Madrid, date of birth 1985, in the clinic at [facility]. Juani Reynoso NP is the patient's PCP. Flora Madrid has an outstanding lab/procedure at the time we reviewed her chart. In order to help keep her health information updated, she has authorized us to request the following medical record(s):        (  )  MAMMOGRAM                                      (  )  COLONOSCOPY      ( x )  PAP SMEAR                                          (  )  OUTSIDE LAB RESULTS     (  )  DEXA SCAN                                          (  )  EYE EXAM            (  )  FOOT EXAM                                          (  )  ENTIRE RECORD     (  )  OUTSIDE IMMUNIZATIONS                 (  )  _______________         Please fax records to Juani Reynoso NP, [phone]     If you have any questions, please contact Alma Rosa at [phone].           Patient Name: Flora Madrid  : 1985  Patient Phone #: [phone]

## 2025-01-24 DIAGNOSIS — F90.2 ATTENTION DEFICIT HYPERACTIVITY DISORDER (ADHD), COMBINED TYPE: Primary | ICD-10-CM

## 2025-01-24 RX ORDER — LISDEXAMFETAMINE DIMESYLATE 40 MG/1
40 CAPSULE ORAL DAILY
Qty: 30 CAPSULE | Refills: 0 | Status: SHIPPED | OUTPATIENT
Start: 2025-01-24

## 2025-02-05 ENCOUNTER — OFFICE VISIT (OUTPATIENT)
Dept: PRIMARY CARE CLINIC | Facility: CLINIC | Age: 40
End: 2025-02-05
Payer: MEDICAID

## 2025-02-05 VITALS
HEIGHT: 65 IN | WEIGHT: 132.19 LBS | BODY MASS INDEX: 22.02 KG/M2 | HEART RATE: 81 BPM | DIASTOLIC BLOOD PRESSURE: 68 MMHG | RESPIRATION RATE: 18 BRPM | TEMPERATURE: 99 F | SYSTOLIC BLOOD PRESSURE: 118 MMHG

## 2025-02-05 DIAGNOSIS — F90.2 ATTENTION DEFICIT HYPERACTIVITY DISORDER (ADHD), COMBINED TYPE: Primary | ICD-10-CM

## 2025-02-05 PROCEDURE — 1159F MED LIST DOCD IN RCRD: CPT | Mod: CPTII,,, | Performed by: NURSE PRACTITIONER

## 2025-02-05 PROCEDURE — 3044F HG A1C LEVEL LT 7.0%: CPT | Mod: CPTII,,, | Performed by: NURSE PRACTITIONER

## 2025-02-05 PROCEDURE — 99213 OFFICE O/P EST LOW 20 MIN: CPT | Mod: S$PBB,,, | Performed by: NURSE PRACTITIONER

## 2025-02-05 PROCEDURE — 99999 PR PBB SHADOW E&M-EST. PATIENT-LVL III: CPT | Mod: PBBFAC,,, | Performed by: NURSE PRACTITIONER

## 2025-02-05 PROCEDURE — 99213 OFFICE O/P EST LOW 20 MIN: CPT | Mod: PBBFAC | Performed by: NURSE PRACTITIONER

## 2025-02-05 PROCEDURE — 3074F SYST BP LT 130 MM HG: CPT | Mod: CPTII,,, | Performed by: NURSE PRACTITIONER

## 2025-02-05 PROCEDURE — 3008F BODY MASS INDEX DOCD: CPT | Mod: CPTII,,, | Performed by: NURSE PRACTITIONER

## 2025-02-05 PROCEDURE — 1160F RVW MEDS BY RX/DR IN RCRD: CPT | Mod: CPTII,,, | Performed by: NURSE PRACTITIONER

## 2025-02-05 PROCEDURE — 3078F DIAST BP <80 MM HG: CPT | Mod: CPTII,,, | Performed by: NURSE PRACTITIONER

## 2025-02-05 RX ORDER — LISDEXAMFETAMINE DIMESYLATE 50 MG/1
50 CAPSULE ORAL EVERY MORNING
Qty: 30 CAPSULE | Refills: 0 | Status: SHIPPED | OUTPATIENT
Start: 2025-02-05 | End: 2025-03-03 | Stop reason: SDUPTHER

## 2025-02-05 NOTE — PROGRESS NOTES
[facility] Note    HPI:  Flora Madrid is a 39 y.o. female who presents today for Follow-up (Pt here to discuss increase in mg of vyvanse)     Patient would like to increase dose of Vyvanse    Review of Systems   Constitutional: Negative.    HENT: Negative.     Eyes: Negative.    Respiratory: Negative.     Cardiovascular: Negative.    Gastrointestinal: Negative.    Genitourinary: Negative.    Musculoskeletal: Negative.    Skin: Negative.    Neurological: Negative.    Endo/Heme/Allergies: Negative.    Psychiatric/Behavioral: Negative.        A review of systems was performed and was negative except as noted above.    I personally reviewed allergies, past medical, surgical, social and family history and updated as appropriate.    Medications:    Current Outpatient Medications:     lisdexamfetamine (VYVANSE) 50 MG capsule, Take 1 capsule (50 mg total) by mouth every morning., Disp: 30 capsule, Rfl: 0     Health Maintenance:  Immunization History   Administered Date(s) Administered    DTP 1986, 1986, 02/10/1987, 12/18/1987    Hepatitis B, Pediatric/Adolescent 12/11/2000, 11/28/2001    MMR 12/18/1987, 01/11/2000    Td (ADULT) 01/11/2000    Tdap 12/27/2013, 01/03/2018      Health Maintenance   Topic Date Due    COVID-19 Vaccine (1 - 2024-25 season) 01/14/2026 (Originally 9/1/2024)    Cervical Cancer Screening  02/07/2026    TETANUS VACCINE  01/03/2028    RSV Vaccine (Age 60+ and Pregnant patients) (1 - 1-dose 75+ series) 08/04/2061    Hepatitis C Screening  Completed    HIV Screening  Completed    Lipid Panel  Completed    Pneumococcal Vaccines (Age 0-49)  Aged Out    Influenza Vaccine  Discontinued     Health Maintenance Topics with due status: Not Due       Topic Last Completion Date    TETANUS VACCINE 01/03/2018    Cervical Cancer Screening 02/07/2023    RSV Vaccine (Age 60+ and Pregnant patients) Not Due     There are no preventive care reminders to display for this  "patient.    PHYSICAL EXAM:  Vitals:    02/05/25 0849   BP: 118/68   BP Location: Left arm   Patient Position: Sitting   Pulse: 81   Resp: 18   Temp: 98.6 °F (37 °C)   TempSrc: Temporal   SpO2: (P) 96%   Weight: 60 kg (132 lb 3.2 oz)   Height: 5' 5" (1.651 m)     Body mass index is 22 kg/m².  Physical Exam     ASSESSMENT/PLAN:  1. Attention deficit hyperactivity disorder (ADHD), combined type  -     lisdexamfetamine (VYVANSE) 50 MG capsule; Take 1 capsule (50 mg total) by mouth every morning.  Dispense: 30 capsule; Refill: 0        Other than changes above, continue current medications and maintain follow up with specialists.      Follow up in about 3 months (around 5/5/2025) for ADHD.   Recent Results (from the past 12 weeks)   Comprehensive Metabolic Panel    Collection Time: 01/14/25  2:31 PM   Result Value Ref Range    Sodium 139 136 - 145 mmol/L    Potassium 3.7 3.5 - 5.1 mmol/L    Chloride 103 95 - 110 mmol/L    CO2 26 23 - 29 mmol/L    Glucose 73 70 - 110 mg/dL    BUN 18 6 - 20 mg/dL    Creatinine 0.7 0.5 - 1.4 mg/dL    Calcium 9.0 8.7 - 10.5 mg/dL    Total Protein 6.7 6.0 - 8.4 g/dL    Albumin 4.4 3.5 - 5.2 g/dL    Total Bilirubin 0.3 0.1 - 1.0 mg/dL    Alkaline Phosphatase 49 (L) 55 - 135 U/L    AST 23 10 - 40 U/L    ALT 16 10 - 44 U/L    eGFR >60.0 >60 mL/min/1.73 m^2    Anion Gap 10 8 - 16 mmol/L   CBC Auto Differential    Collection Time: 01/14/25  2:31 PM   Result Value Ref Range    WBC 5.57 3.90 - 12.70 K/uL    RBC 4.64 4.00 - 5.40 M/uL    Hemoglobin 14.4 12.0 - 16.0 g/dL    Hematocrit 42.5 37.0 - 48.5 %    MCV 92 82 - 98 fL    MCH 31.0 27.0 - 31.0 pg    MCHC 33.9 32.0 - 36.0 g/dL    RDW 11.2 (L) 11.5 - 14.5 %    Platelets 272 150 - 450 K/uL    MPV 9.6 9.2 - 12.9 fL    Immature Granulocytes 0.2 0.0 - 0.5 %    Gran # (ANC) 3.5 1.8 - 7.7 K/uL    Immature Grans (Abs) 0.01 0.00 - 0.04 K/uL    Lymph # 1.5 1.0 - 4.8 K/uL    Mono # 0.5 0.3 - 1.0 K/uL    Eos # 0.1 0.0 - 0.5 K/uL    Baso # 0.04 0.00 - 0.20 K/uL "    nRBC 0 0 /100 WBC    Gran % 63.5 38.0 - 73.0 %    Lymph % 26.6 18.0 - 48.0 %    Mono % 8.1 4.0 - 15.0 %    Eosinophil % 0.9 0.0 - 8.0 %    Basophil % 0.7 0.0 - 1.9 %    Differential Method Automated    TSH    Collection Time: 01/14/25  2:31 PM   Result Value Ref Range    TSH 0.593 0.400 - 4.000 uIU/mL   Lipid Panel    Collection Time: 01/14/25  2:31 PM   Result Value Ref Range    Cholesterol 136 120 - 199 mg/dL    Triglycerides 40 30 - 150 mg/dL    HDL 66 40 - 75 mg/dL    LDL Cholesterol 62.0 (L) 63.0 - 159.0 mg/dL    HDL/Cholesterol Ratio 48.5 20.0 - 50.0 %    Total Cholesterol/HDL Ratio 2.1 2.0 - 5.0    Non-HDL Cholesterol 70 mg/dL   Hemoglobin A1C    Collection Time: 01/14/25  2:31 PM   Result Value Ref Range    Hemoglobin A1C 4.8 4.0 - 5.6 %    Estimated Avg Glucose 91 68 - 131 mg/dL   Hepatitis C Antibody    Collection Time: 01/14/25  2:31 PM   Result Value Ref Range    Hepatitis C Ab Non-reactive Non-reactive   HIV 1/2 Ag/Ab (4th Gen)    Collection Time: 01/14/25  2:31 PM   Result Value Ref Range    HIV 1/2 Ag/Ab Non-reactive Non-reactive   Vitamin D    Collection Time: 01/14/25  2:31 PM   Result Value Ref Range    Vit D, 25-Hydroxy 32 30 - 96 ng/mL   Microalbumin/Creatinine Ratio, Urine    Collection Time: 01/14/25  2:38 PM   Result Value Ref Range    Microalbumin, Urine 9.0 ug/mL    Creatinine, Urine 59.0 15.0 - 325.0 mg/dL    Microalb/Creat Ratio 15.3 0.0 - 30.0 ug/mg         WILLIE Sotomayor  [facility]

## 2025-02-06 ENCOUNTER — PATIENT OUTREACH (OUTPATIENT)
Dept: ADMINISTRATIVE | Facility: HOSPITAL | Age: 40
End: 2025-02-06
Payer: MEDICAID

## 2025-02-26 ENCOUNTER — TELEPHONE (OUTPATIENT)
Dept: PRIMARY CARE CLINIC | Facility: CLINIC | Age: 40
End: 2025-02-26
Payer: MEDICAID

## 2025-02-26 DIAGNOSIS — F90.2 ATTENTION DEFICIT HYPERACTIVITY DISORDER (ADHD), COMBINED TYPE: ICD-10-CM

## 2025-02-26 RX ORDER — LISDEXAMFETAMINE DIMESYLATE 50 MG/1
50 CAPSULE ORAL EVERY MORNING
Qty: 30 CAPSULE | Refills: 0 | OUTPATIENT
Start: 2025-02-26

## 2025-02-26 NOTE — TELEPHONE ENCOUNTER
----- Message from Marcia sent at 2/26/2025 10:15 AM CST -----  Regarding: Refill  lisdexamfetamine (VYVANSE) 50 MG capsuleclinic

## 2025-03-03 DIAGNOSIS — F90.2 ATTENTION DEFICIT HYPERACTIVITY DISORDER (ADHD), COMBINED TYPE: ICD-10-CM

## 2025-03-03 NOTE — TELEPHONE ENCOUNTER
----- Message from Marcia sent at 3/3/2025  3:28 PM CST -----  Regarding: refill  Cliniclisdexamfetamine (VYVANSE) 50 MG capsule

## 2025-03-04 RX ORDER — LISDEXAMFETAMINE DIMESYLATE 50 MG/1
50 CAPSULE ORAL EVERY MORNING
Qty: 30 CAPSULE | Refills: 0 | Status: SHIPPED | OUTPATIENT
Start: 2025-03-04

## 2025-03-28 ENCOUNTER — OFFICE VISIT (OUTPATIENT)
Dept: PRIMARY CARE CLINIC | Facility: CLINIC | Age: 40
End: 2025-03-28
Payer: MEDICAID

## 2025-03-28 VITALS
HEART RATE: 105 BPM | BODY MASS INDEX: 21.33 KG/M2 | OXYGEN SATURATION: 100 % | DIASTOLIC BLOOD PRESSURE: 85 MMHG | HEIGHT: 65 IN | WEIGHT: 128 LBS | SYSTOLIC BLOOD PRESSURE: 135 MMHG

## 2025-03-28 DIAGNOSIS — F90.2 ATTENTION DEFICIT HYPERACTIVITY DISORDER (ADHD), COMBINED TYPE: Primary | ICD-10-CM

## 2025-03-28 PROCEDURE — 99999 PR PBB SHADOW E&M-EST. PATIENT-LVL III: CPT | Mod: PBBFAC,,, | Performed by: NURSE PRACTITIONER

## 2025-03-28 PROCEDURE — 99213 OFFICE O/P EST LOW 20 MIN: CPT | Mod: PBBFAC | Performed by: NURSE PRACTITIONER

## 2025-03-28 RX ORDER — LISDEXAMFETAMINE DIMESYLATE 60 MG/1
60 CAPSULE ORAL EVERY MORNING
Qty: 30 CAPSULE | Refills: 0 | Status: SHIPPED | OUTPATIENT
Start: 2025-03-28

## 2025-03-28 NOTE — PROGRESS NOTES
"[facility] Note    HPI:  Flora Madrid is a 39 y.o. female who presents today for Follow-up (Medication follow up.   Patient asking for an increase dose)        Review of Systems   Constitutional: Negative.    HENT: Negative.     Eyes: Negative.    Respiratory: Negative.     Cardiovascular: Negative.    Gastrointestinal: Negative.    Genitourinary: Negative.    Musculoskeletal: Negative.    Skin: Negative.    Neurological: Negative.    Endo/Heme/Allergies: Negative.    Psychiatric/Behavioral: Negative.        A review of systems was performed and was negative except as noted above.    I personally reviewed allergies, past medical, surgical, social and family history and updated as appropriate.    Medications:  Current Medications[1]     Health Maintenance:  Immunization History   Administered Date(s) Administered    DTP 1986, 1986, 02/10/1987, 12/18/1987    Hepatitis B, Pediatric/Adolescent 12/11/2000, 11/28/2001    MMR 12/18/1987, 01/11/2000    Td (ADULT) 01/11/2000    Tdap 12/27/2013, 01/03/2018      Health Maintenance   Topic Date Due    COVID-19 Vaccine (1 - 2024-25 season) 01/14/2026 (Originally 9/1/2024)    Cervical Cancer Screening  02/07/2026    TETANUS VACCINE  01/03/2028    RSV Vaccine (Age 60+ and Pregnant patients) (1 - 1-dose 75+ series) 08/04/2061    Hepatitis C Screening  Completed    HIV Screening  Completed    Lipid Panel  Completed    Pneumococcal Vaccines (Age 0-49)  Aged Out    Influenza Vaccine  Discontinued     Health Maintenance Topics with due status: Not Due       Topic Last Completion Date    TETANUS VACCINE 01/03/2018    Cervical Cancer Screening 02/07/2023    RSV Vaccine (Age 60+ and Pregnant patients) Not Due     There are no preventive care reminders to display for this patient.    PHYSICAL EXAM:  Vitals:    03/28/25 0821   BP: 135/85   Patient Position: Sitting   Pulse: 105   SpO2: 100%   Weight: 58.1 kg (128 lb)   Height: 5' 5" (1.651 m)     Body mass " index is 21.3 kg/m².  Physical Exam  Vitals and nursing note reviewed.   Constitutional:       Appearance: Normal appearance. She is normal weight.   HENT:      Head: Normocephalic.      Nose: Nose normal.      Mouth/Throat:      Mouth: Mucous membranes are moist.   Cardiovascular:      Rate and Rhythm: Normal rate and regular rhythm.      Heart sounds: Normal heart sounds.   Pulmonary:      Effort: Pulmonary effort is normal.      Breath sounds: Normal breath sounds.   Musculoskeletal:         General: Normal range of motion.      Cervical back: Normal range of motion.   Skin:     General: Skin is warm and dry.   Neurological:      General: No focal deficit present.      Mental Status: She is alert and oriented to person, place, and time.          ASSESSMENT/PLAN:  1. Attention deficit hyperactivity disorder (ADHD), combined type  -     lisdexamfetamine (VYVANSE) 60 MG capsule; Take 1 capsule (60 mg total) by mouth every morning.  Dispense: 30 capsule; Refill: 0        Other than changes above, continue current medications and maintain follow up with specialists.      Follow up in about 3 months (around 6/28/2025) for ADHD.   Recent Results (from the past 12 weeks)   Comprehensive Metabolic Panel    Collection Time: 01/14/25  2:31 PM   Result Value Ref Range    Sodium 139 136 - 145 mmol/L    Potassium 3.7 3.5 - 5.1 mmol/L    Chloride 103 95 - 110 mmol/L    CO2 26 23 - 29 mmol/L    Glucose 73 70 - 110 mg/dL    BUN 18 6 - 20 mg/dL    Creatinine 0.7 0.5 - 1.4 mg/dL    Calcium 9.0 8.7 - 10.5 mg/dL    Total Protein 6.7 6.0 - 8.4 g/dL    Albumin 4.4 3.5 - 5.2 g/dL    Total Bilirubin 0.3 0.1 - 1.0 mg/dL    Alkaline Phosphatase 49 (L) 55 - 135 U/L    AST 23 10 - 40 U/L    ALT 16 10 - 44 U/L    eGFR >60.0 >60 mL/min/1.73 m^2    Anion Gap 10 8 - 16 mmol/L   CBC Auto Differential    Collection Time: 01/14/25  2:31 PM   Result Value Ref Range    WBC 5.57 3.90 - 12.70 K/uL    RBC 4.64 4.00 - 5.40 M/uL    Hemoglobin 14.4 12.0 -  16.0 g/dL    Hematocrit 42.5 37.0 - 48.5 %    MCV 92 82 - 98 fL    MCH 31.0 27.0 - 31.0 pg    MCHC 33.9 32.0 - 36.0 g/dL    RDW 11.2 (L) 11.5 - 14.5 %    Platelets 272 150 - 450 K/uL    MPV 9.6 9.2 - 12.9 fL    Immature Granulocytes 0.2 0.0 - 0.5 %    Gran # (ANC) 3.5 1.8 - 7.7 K/uL    Immature Grans (Abs) 0.01 0.00 - 0.04 K/uL    Lymph # 1.5 1.0 - 4.8 K/uL    Mono # 0.5 0.3 - 1.0 K/uL    Eos # 0.1 0.0 - 0.5 K/uL    Baso # 0.04 0.00 - 0.20 K/uL    nRBC 0 0 /100 WBC    Gran % 63.5 38.0 - 73.0 %    Lymph % 26.6 18.0 - 48.0 %    Mono % 8.1 4.0 - 15.0 %    Eosinophil % 0.9 0.0 - 8.0 %    Basophil % 0.7 0.0 - 1.9 %    Differential Method Automated    TSH    Collection Time: 01/14/25  2:31 PM   Result Value Ref Range    TSH 0.593 0.400 - 4.000 uIU/mL   Lipid Panel    Collection Time: 01/14/25  2:31 PM   Result Value Ref Range    Cholesterol 136 120 - 199 mg/dL    Triglycerides 40 30 - 150 mg/dL    HDL 66 40 - 75 mg/dL    LDL Cholesterol 62.0 (L) 63.0 - 159.0 mg/dL    HDL/Cholesterol Ratio 48.5 20.0 - 50.0 %    Total Cholesterol/HDL Ratio 2.1 2.0 - 5.0    Non-HDL Cholesterol 70 mg/dL   Hemoglobin A1C    Collection Time: 01/14/25  2:31 PM   Result Value Ref Range    Hemoglobin A1C 4.8 4.0 - 5.6 %    Estimated Avg Glucose 91 68 - 131 mg/dL   Hepatitis C Antibody    Collection Time: 01/14/25  2:31 PM   Result Value Ref Range    Hepatitis C Ab Non-reactive Non-reactive   HIV 1/2 Ag/Ab (4th Gen)    Collection Time: 01/14/25  2:31 PM   Result Value Ref Range    HIV 1/2 Ag/Ab Non-reactive Non-reactive   Vitamin D    Collection Time: 01/14/25  2:31 PM   Result Value Ref Range    Vit D, 25-Hydroxy 32 30 - 96 ng/mL   Microalbumin/Creatinine Ratio, Urine    Collection Time: 01/14/25  2:38 PM   Result Value Ref Range    Microalbumin, Urine 9.0 ug/mL    Creatinine, Urine 59.0 15.0 - 325.0 mg/dL    Microalb/Creat Ratio 15.3 0.0 - 30.0 ug/mg         WILLIE Sotomayor  [facility]                       [1]   Current Outpatient  Medications:     lisdexamfetamine (VYVANSE) 60 MG capsule, Take 1 capsule (60 mg total) by mouth every morning., Disp: 30 capsule, Rfl: 0

## 2025-03-31 ENCOUNTER — TELEPHONE (OUTPATIENT)
Dept: PRIMARY CARE CLINIC | Facility: CLINIC | Age: 40
End: 2025-03-31
Payer: MEDICAID

## 2025-04-24 DIAGNOSIS — F90.2 ATTENTION DEFICIT HYPERACTIVITY DISORDER (ADHD), COMBINED TYPE: ICD-10-CM

## 2025-04-24 RX ORDER — LISDEXAMFETAMINE DIMESYLATE 60 MG/1
60 CAPSULE ORAL EVERY MORNING
Qty: 30 CAPSULE | Refills: 0 | Status: SHIPPED | OUTPATIENT
Start: 2025-04-24

## 2025-05-05 ENCOUNTER — OFFICE VISIT (OUTPATIENT)
Dept: PRIMARY CARE CLINIC | Facility: CLINIC | Age: 40
End: 2025-05-05
Payer: MEDICAID

## 2025-05-05 VITALS
WEIGHT: 130.88 LBS | HEIGHT: 65 IN | TEMPERATURE: 99 F | BODY MASS INDEX: 21.81 KG/M2 | RESPIRATION RATE: 18 BRPM | HEART RATE: 104 BPM | DIASTOLIC BLOOD PRESSURE: 88 MMHG | OXYGEN SATURATION: 100 % | SYSTOLIC BLOOD PRESSURE: 134 MMHG

## 2025-05-05 DIAGNOSIS — F90.2 ATTENTION DEFICIT HYPERACTIVITY DISORDER (ADHD), COMBINED TYPE: Primary | ICD-10-CM

## 2025-05-05 PROCEDURE — 3079F DIAST BP 80-89 MM HG: CPT | Mod: CPTII,,, | Performed by: NURSE PRACTITIONER

## 2025-05-05 PROCEDURE — 99213 OFFICE O/P EST LOW 20 MIN: CPT | Mod: PBBFAC | Performed by: NURSE PRACTITIONER

## 2025-05-05 PROCEDURE — 3044F HG A1C LEVEL LT 7.0%: CPT | Mod: CPTII,,, | Performed by: NURSE PRACTITIONER

## 2025-05-05 PROCEDURE — 99999 PR PBB SHADOW E&M-EST. PATIENT-LVL III: CPT | Mod: PBBFAC,,, | Performed by: NURSE PRACTITIONER

## 2025-05-05 PROCEDURE — 3008F BODY MASS INDEX DOCD: CPT | Mod: CPTII,,, | Performed by: NURSE PRACTITIONER

## 2025-05-05 PROCEDURE — 3075F SYST BP GE 130 - 139MM HG: CPT | Mod: CPTII,,, | Performed by: NURSE PRACTITIONER

## 2025-05-05 PROCEDURE — 99214 OFFICE O/P EST MOD 30 MIN: CPT | Mod: S$PBB,,, | Performed by: NURSE PRACTITIONER

## 2025-05-05 RX ORDER — LISDEXAMFETAMINE DIMESYLATE 70 MG/1
70 CAPSULE ORAL EVERY MORNING
Qty: 30 CAPSULE | Refills: 0 | Status: SHIPPED | OUTPATIENT
Start: 2025-05-05

## 2025-05-05 NOTE — PROGRESS NOTES
"[facility] Note    HPI:  Flora Madrid is a 39 y.o. female who presents today for Follow-up and Medication Refill (Pt here for med refill wants to discuss increase)        ROS   A review of systems was performed and was negative except as noted above.    I personally reviewed allergies, past medical, surgical, social and family history and updated as appropriate.    Medications:  Current Medications[1]     Health Maintenance:  Immunization History   Administered Date(s) Administered    DTP 1986, 1986, 02/10/1987, 12/18/1987    Hepatitis B, Pediatric/Adolescent 12/11/2000, 11/28/2001    MMR 12/18/1987, 01/11/2000    Td (ADULT) 01/11/2000    Tdap 12/27/2013, 01/03/2018      Health Maintenance   Topic Date Due    COVID-19 Vaccine (1 - 2024-25 season) 01/14/2026 (Originally 9/1/2024)    Cervical Cancer Screening  02/07/2026    TETANUS VACCINE  01/03/2028    RSV Vaccine (Age 60+ and Pregnant patients) (1 - 1-dose 75+ series) 08/04/2061    Hepatitis C Screening  Completed    HIV Screening  Completed    Lipid Panel  Completed    Pneumococcal Vaccines (Age 0-49)  Aged Out    Influenza Vaccine  Discontinued     Health Maintenance Topics with due status: Not Due       Topic Last Completion Date    TETANUS VACCINE 01/03/2018    Cervical Cancer Screening 02/07/2023    RSV Vaccine (Age 60+ and Pregnant patients) Not Due     There are no preventive care reminders to display for this patient.    PHYSICAL EXAM:  Vitals:    05/05/25 1019   BP: 134/88   Pulse: 104   Resp: 18   Temp: 98.6 °F (37 °C)   TempSrc: Temporal   SpO2: 100%   Weight: 59.4 kg (130 lb 14.4 oz)   Height: 5' 5" (1.651 m)     Body mass index is 21.78 kg/m².  Physical Exam     ASSESSMENT/PLAN:  1. Attention deficit hyperactivity disorder (ADHD), combined type  -     lisdexamfetamine (VYVANSE) 70 MG capsule; Take 1 capsule (70 mg total) by mouth every morning.  Dispense: 30 capsule; Refill: 0        Other than changes above, continue " current medications and maintain follow up with specialists.      Follow up in about 3 months (around 8/5/2025) for ADHD.   No results found for this or any previous visit (from the past 12 weeks).      WILLIE Sotomayor  [facility]                       [1]   Current Outpatient Medications:     lisdexamfetamine (VYVANSE) 70 MG capsule, Take 1 capsule (70 mg total) by mouth every morning., Disp: 30 capsule, Rfl: 0

## 2025-05-23 ENCOUNTER — TELEPHONE (OUTPATIENT)
Dept: PRIMARY CARE CLINIC | Facility: CLINIC | Age: 40
End: 2025-05-23
Payer: MEDICAID

## 2025-05-23 ENCOUNTER — PATIENT MESSAGE (OUTPATIENT)
Dept: PRIMARY CARE CLINIC | Facility: CLINIC | Age: 40
End: 2025-05-23
Payer: MEDICAID

## 2025-05-23 DIAGNOSIS — L98.499 CALLOUS ULCER, UNSPECIFIED ULCER STAGE: Primary | ICD-10-CM

## 2025-05-23 NOTE — TELEPHONE ENCOUNTER
Need to get referral to a podiatrist  Patient states she has calluses on both feet.  She states her dermatologist told her she needs to see a podiatrist.

## 2025-05-30 DIAGNOSIS — F90.2 ATTENTION DEFICIT HYPERACTIVITY DISORDER (ADHD), COMBINED TYPE: ICD-10-CM

## 2025-05-30 RX ORDER — LISDEXAMFETAMINE DIMESYLATE 70 MG/1
70 CAPSULE ORAL EVERY MORNING
Qty: 30 CAPSULE | Refills: 0 | Status: SHIPPED | OUTPATIENT
Start: 2025-05-30

## 2025-06-04 ENCOUNTER — TELEPHONE (OUTPATIENT)
Dept: PRIMARY CARE CLINIC | Facility: CLINIC | Age: 40
End: 2025-06-04
Payer: MEDICAID

## 2025-06-04 DIAGNOSIS — M25.562 ACUTE PAIN OF BOTH KNEES: Primary | ICD-10-CM

## 2025-06-04 DIAGNOSIS — M25.561 ACUTE PAIN OF BOTH KNEES: Primary | ICD-10-CM

## 2025-06-04 RX ORDER — PREDNISONE 20 MG/1
20 TABLET ORAL 2 TIMES DAILY
Qty: 10 TABLET | Refills: 0 | Status: SHIPPED | OUTPATIENT
Start: 2025-06-04 | End: 2025-06-09

## 2025-06-30 DIAGNOSIS — F90.2 ATTENTION DEFICIT HYPERACTIVITY DISORDER (ADHD), COMBINED TYPE: ICD-10-CM

## 2025-06-30 RX ORDER — LISDEXAMFETAMINE DIMESYLATE 70 MG/1
70 CAPSULE ORAL EVERY MORNING
Qty: 30 CAPSULE | Refills: 0 | Status: SHIPPED | OUTPATIENT
Start: 2025-06-30

## 2025-07-31 DIAGNOSIS — F90.2 ATTENTION DEFICIT HYPERACTIVITY DISORDER (ADHD), COMBINED TYPE: ICD-10-CM

## 2025-07-31 RX ORDER — LISDEXAMFETAMINE DIMESYLATE 70 MG/1
70 CAPSULE ORAL EVERY MORNING
Qty: 30 CAPSULE | Refills: 0 | Status: SHIPPED | OUTPATIENT
Start: 2025-07-31

## 2025-08-04 ENCOUNTER — HOSPITAL ENCOUNTER (EMERGENCY)
Facility: HOSPITAL | Age: 40
Discharge: HOME | End: 2025-08-04
Attending: EMERGENCY MEDICINE
Payer: MEDICAID

## 2025-08-04 VITALS
WEIGHT: 121.25 LBS | HEART RATE: 100 BPM | BODY MASS INDEX: 20.2 KG/M2 | RESPIRATION RATE: 18 BRPM | OXYGEN SATURATION: 100 % | DIASTOLIC BLOOD PRESSURE: 103 MMHG | SYSTOLIC BLOOD PRESSURE: 173 MMHG | HEIGHT: 65 IN | TEMPERATURE: 98 F

## 2025-08-04 DIAGNOSIS — S00.33XA CONTUSION OF NOSE, INITIAL ENCOUNTER: Primary | ICD-10-CM

## 2025-08-04 DIAGNOSIS — R52 PAIN: ICD-10-CM

## 2025-08-04 PROCEDURE — 99283 EMERGENCY DEPT VISIT LOW MDM: CPT | Mod: 25

## 2025-08-04 RX ORDER — FLUTICASONE PROPIONATE 50 MCG
1 SPRAY, SUSPENSION (ML) NASAL 2 TIMES DAILY
Qty: 15 G | Refills: 0 | Status: SHIPPED | OUTPATIENT
Start: 2025-08-04

## 2025-08-25 ENCOUNTER — OFFICE VISIT (OUTPATIENT)
Dept: PRIMARY CARE CLINIC | Facility: CLINIC | Age: 40
End: 2025-08-25
Payer: MEDICAID

## 2025-08-25 VITALS
HEIGHT: 65 IN | DIASTOLIC BLOOD PRESSURE: 86 MMHG | WEIGHT: 128.31 LBS | SYSTOLIC BLOOD PRESSURE: 139 MMHG | HEART RATE: 101 BPM | BODY MASS INDEX: 21.38 KG/M2

## 2025-08-25 DIAGNOSIS — F90.2 ATTENTION DEFICIT HYPERACTIVITY DISORDER (ADHD), COMBINED TYPE: ICD-10-CM

## 2025-08-25 PROCEDURE — 99999 PR PBB SHADOW E&M-EST. PATIENT-LVL II: CPT | Mod: PBBFAC,,, | Performed by: NURSE PRACTITIONER

## 2025-08-25 PROCEDURE — 99212 OFFICE O/P EST SF 10 MIN: CPT | Mod: PBBFAC | Performed by: NURSE PRACTITIONER

## 2025-08-25 PROCEDURE — 1159F MED LIST DOCD IN RCRD: CPT | Mod: CPTII,,, | Performed by: NURSE PRACTITIONER

## 2025-08-25 PROCEDURE — 3079F DIAST BP 80-89 MM HG: CPT | Mod: CPTII,,, | Performed by: NURSE PRACTITIONER

## 2025-08-25 PROCEDURE — 3008F BODY MASS INDEX DOCD: CPT | Mod: CPTII,,, | Performed by: NURSE PRACTITIONER

## 2025-08-25 PROCEDURE — 99212 OFFICE O/P EST SF 10 MIN: CPT | Mod: S$PBB,,, | Performed by: NURSE PRACTITIONER

## 2025-08-25 PROCEDURE — 3044F HG A1C LEVEL LT 7.0%: CPT | Mod: CPTII,,, | Performed by: NURSE PRACTITIONER

## 2025-08-25 PROCEDURE — 3075F SYST BP GE 130 - 139MM HG: CPT | Mod: CPTII,,, | Performed by: NURSE PRACTITIONER

## 2025-08-25 RX ORDER — LISDEXAMFETAMINE DIMESYLATE 70 MG/1
70 CAPSULE ORAL EVERY MORNING
Qty: 30 CAPSULE | Refills: 0 | Status: SHIPPED | OUTPATIENT
Start: 2025-08-25

## 2025-09-04 ENCOUNTER — TELEPHONE (OUTPATIENT)
Dept: PRIMARY CARE CLINIC | Facility: CLINIC | Age: 40
End: 2025-09-04
Payer: MEDICAID

## 2025-09-04 DIAGNOSIS — B00.1 FEVER BLISTER: Primary | ICD-10-CM

## 2025-09-04 RX ORDER — VALACYCLOVIR HYDROCHLORIDE 1 G/1
2000 TABLET, FILM COATED ORAL 2 TIMES DAILY
Qty: 30 TABLET | Refills: 0 | Status: SHIPPED | OUTPATIENT
Start: 2025-09-04

## (undated) DEVICE — SCALPEL SZ 11 RETRACTABLE

## (undated) DEVICE — ADHESIVE DERMABOND ADVANCED

## (undated) DEVICE — GLOVE SURGICAL LATEX SZ 6.5

## (undated) DEVICE — GLOVE BIOGEL SKINSENSE PI 8.0

## (undated) DEVICE — WRAP KNEE ACCU THERM GEL PACK

## (undated) DEVICE — BRACE KNEE T SCOPE PREMIER

## (undated) DEVICE — SUT VICRYL PLUS 0 CT1 18IN

## (undated) DEVICE — GLOVE PROTEXIS LIGHT BROWN 8.5

## (undated) DEVICE — SUT 3-0 VICRYL / SH (J416)

## (undated) DEVICE — GLOVE PROTEXIS LTX MICRO  7.5

## (undated) DEVICE — SYR 10CC LUER LOCK

## (undated) DEVICE — SEE MEDLINE ITEM 146298

## (undated) DEVICE — SUT FIBERWIRE LOOP STRAIGHT

## (undated) DEVICE — SKIN MARKER STER DUAL TIP

## (undated) DEVICE — DRAPE TOP 53X102IN

## (undated) DEVICE — REAMER LOW PROFILE 10 MM

## (undated) DEVICE — TUBE SET INFLOW/OUTFLOW

## (undated) DEVICE — SYR 3ML SAFETY NDL 22G 1.5IN

## (undated) DEVICE — COVER OVERHEAD SURG LT BLUE

## (undated) DEVICE — PAD ELECTRODE STER 1.5X3

## (undated) DEVICE — NDL SPINAL 18GX3.5 SPINOCAN

## (undated) DEVICE — DRESSING AQUACEL AG SILVER 4X4

## (undated) DEVICE — PAD CAST SPECIALIST STRL 6

## (undated) DEVICE — APPLICATOR CHLORAPREP ORN 26ML

## (undated) DEVICE — DRAPE C-ARM MINI DISP

## (undated) DEVICE — PACK GENERAL SURGERY

## (undated) DEVICE — PROBE ULTRASOUND MAMMOTOME 10G

## (undated) DEVICE — SOL NACL IRR 1000ML BTL

## (undated) DEVICE — CANNISTER 1200CC

## (undated) DEVICE — TOURNIQUET SB QC DP 34X4IN

## (undated) DEVICE — SUT MONO 3-0 PS-2 18 PLST

## (undated) DEVICE — UNDERGLOVES BIOGEL PI SIZE 8.5

## (undated) DEVICE — SET PULL UP PROBE COVER 5X48IN

## (undated) DEVICE — BLADE SHAVER LANZA 4.2X13CM

## (undated) DEVICE — GLOVE BIOGEL SKINSENSE PI 7.0

## (undated) DEVICE — NDL ECLIPSE SAFETY 18GX1-1/2IN

## (undated) DEVICE — KIT ACL DISP W/O SAW BLADE

## (undated) DEVICE — BLADE SURG STAINLESS STEEL #15

## (undated) DEVICE — GOWN POLY REINF X-LONG 2XL

## (undated) DEVICE — SOL 9P NACL IRR PIC IL

## (undated) DEVICE — DRESSING TELFA N ADH 3X8

## (undated) DEVICE — SPONGE GAUZE 16PLY 4X4

## (undated) DEVICE — COVER LIGHT HANDLE 80/CA

## (undated) DEVICE — MAT SURGICAL ECOSUCTIONER

## (undated) DEVICE — GLOVE BIOGEL ECLIPSE SZ 7

## (undated) DEVICE — SUT MCRYL PLUS 4-0 PS2 27IN

## (undated) DEVICE — SHAVER SYS 5.5 ULRAFRR

## (undated) DEVICE — BIT DRILL CANNULATED 10MM

## (undated) DEVICE — DRAPE MINOR PROCEDURE

## (undated) DEVICE — NDL ANES SPINAL 18X3.5ST 18G

## (undated) DEVICE — NDL ARTHSCP MF SCORPION

## (undated) DEVICE — PROBE ARTHO ENERGY 90 DEG

## (undated) DEVICE — NDL SAFETY 22G X 1.5 ECLIPSE

## (undated) DEVICE — CONTAINER SPECIMEN OR STER 4OZ

## (undated) DEVICE — DRAPE C-ARM ELAS CLIP 42X120IN

## (undated) DEVICE — TOWEL OR XRAY WHITE 17X26IN

## (undated) DEVICE — SUT X424H ETHIBOND 0-0

## (undated) DEVICE — PROBE ARTHSCP EDGE ENERGY 50

## (undated) DEVICE — SUT 2-0 VICRYL / SH (J417)

## (undated) DEVICE — UNDERGLOVES BIOGEL PI SIZE 7.5

## (undated) DEVICE — BIT DRILL ACL TIGHTROPE 4MM

## (undated) DEVICE — SUT VICRYL PLUS 3-0 SH 18IN

## (undated) DEVICE — SKIN MARKER DEVON 160

## (undated) DEVICE — ADHESIVE MASTISOL VIAL 48/BX

## (undated) DEVICE — UNDERGLOVES BIOGEL PI SZ 7 LF

## (undated) DEVICE — DRESSING PRIMAPORE IV 2X3IN

## (undated) DEVICE — SOL IRR NACL .9% 3000ML

## (undated) DEVICE — UNDERPAD DISPOSABLE 30X30IN

## (undated) DEVICE — BLADE ACL FINE 9.5X25.5X.4MM

## (undated) DEVICE — KIT TOTAL KNEE TKOFG

## (undated) DEVICE — PAD DERMAPROX THCK 11X15X1IN

## (undated) DEVICE — CLOSURE SKIN STERI STRIP 1/2X4

## (undated) DEVICE — STRIP MEDI WND CLSR 1/4X4IN

## (undated) DEVICE — STRAP KNEE & BODY DISP 4X34IN

## (undated) DEVICE — TUBING SUC UNIV W/CONN 12FT